# Patient Record
Sex: MALE | Race: WHITE | NOT HISPANIC OR LATINO | Employment: UNEMPLOYED | URBAN - METROPOLITAN AREA
[De-identification: names, ages, dates, MRNs, and addresses within clinical notes are randomized per-mention and may not be internally consistent; named-entity substitution may affect disease eponyms.]

---

## 2020-07-31 ENCOUNTER — APPOINTMENT (OUTPATIENT)
Dept: RADIOLOGY | Facility: MEDICAL CENTER | Age: 30
End: 2020-07-31
Attending: EMERGENCY MEDICINE

## 2020-07-31 ENCOUNTER — HOSPITAL ENCOUNTER (EMERGENCY)
Facility: MEDICAL CENTER | Age: 30
End: 2020-08-01
Attending: EMERGENCY MEDICINE

## 2020-07-31 DIAGNOSIS — R07.9 CHEST PAIN, UNSPECIFIED TYPE: ICD-10-CM

## 2020-07-31 DIAGNOSIS — J45.21 MILD INTERMITTENT ASTHMA WITH ACUTE EXACERBATION: ICD-10-CM

## 2020-07-31 PROCEDURE — 93005 ELECTROCARDIOGRAM TRACING: CPT | Performed by: EMERGENCY MEDICINE

## 2020-07-31 PROCEDURE — 99284 EMERGENCY DEPT VISIT MOD MDM: CPT

## 2020-07-31 PROCEDURE — 36415 COLL VENOUS BLD VENIPUNCTURE: CPT

## 2020-07-31 PROCEDURE — 700111 HCHG RX REV CODE 636 W/ 250 OVERRIDE (IP): Performed by: EMERGENCY MEDICINE

## 2020-07-31 PROCEDURE — 71045 X-RAY EXAM CHEST 1 VIEW: CPT

## 2020-07-31 PROCEDURE — 700102 HCHG RX REV CODE 250 W/ 637 OVERRIDE(OP): Performed by: EMERGENCY MEDICINE

## 2020-07-31 PROCEDURE — A9270 NON-COVERED ITEM OR SERVICE: HCPCS | Performed by: EMERGENCY MEDICINE

## 2020-07-31 RX ORDER — ALBUTEROL SULFATE 90 UG/1
2 AEROSOL, METERED RESPIRATORY (INHALATION) ONCE
Status: COMPLETED | OUTPATIENT
Start: 2020-07-31 | End: 2020-07-31

## 2020-07-31 RX ORDER — PREDNISONE 20 MG/1
40 TABLET ORAL ONCE
Status: COMPLETED | OUTPATIENT
Start: 2020-07-31 | End: 2020-07-31

## 2020-07-31 RX ADMIN — PREDNISONE 40 MG: 20 TABLET ORAL at 23:26

## 2020-07-31 RX ADMIN — ALBUTEROL SULFATE 2 PUFF: 90 AEROSOL, METERED RESPIRATORY (INHALATION) at 23:26

## 2020-07-31 ASSESSMENT — LIFESTYLE VARIABLES
DOES PATIENT WANT TO STOP DRINKING: NO
DO YOU DRINK ALCOHOL: NO

## 2020-08-01 VITALS
WEIGHT: 115.74 LBS | SYSTOLIC BLOOD PRESSURE: 99 MMHG | DIASTOLIC BLOOD PRESSURE: 61 MMHG | BODY MASS INDEX: 18.6 KG/M2 | HEART RATE: 71 BPM | RESPIRATION RATE: 20 BRPM | TEMPERATURE: 97.7 F | HEIGHT: 66 IN | OXYGEN SATURATION: 93 %

## 2020-08-01 LAB — EKG IMPRESSION: NORMAL

## 2020-08-01 RX ORDER — FLUTICASONE PROPIONATE 220 UG/1
1 AEROSOL, METERED RESPIRATORY (INHALATION) 2 TIMES DAILY
Qty: 12 G | Refills: 0 | Status: SHIPPED | OUTPATIENT
Start: 2020-08-01 | End: 2024-03-05

## 2020-08-01 RX ORDER — PREDNISONE 20 MG/1
40 TABLET ORAL DAILY
Qty: 6 TAB | Refills: 0 | Status: SHIPPED | OUTPATIENT
Start: 2020-08-01 | End: 2020-08-04

## 2020-08-01 RX ORDER — ALBUTEROL SULFATE 90 UG/1
2 AEROSOL, METERED RESPIRATORY (INHALATION) EVERY 6 HOURS PRN
Qty: 8.5 G | Refills: 0 | Status: ON HOLD | OUTPATIENT
Start: 2020-08-01 | End: 2024-03-07

## 2020-08-01 NOTE — ED TRIAGE NOTES
"Sree Jack   30 y.o. male   Chief Complaint   Patient presents with   • Asthma     pt has hx asthma, felt SOB today and does not have an inhaler. he is newly homeless and relocated from [place]      Pt amb to triage with steady gait for above complaint.   Pt is alert and oriented, speaking in full sentences, follows commands and responds appropriately to questions. Pt has audible wheezing with some accessory muscle use, pt not tachypneic and does not appear to be in severe distress. Charge Nurse kodi made aware of patient.    Pt placed in lobby. Pt educated on triage process. Pt encouraged to alert staff for any changes.    /63   Pulse 69   Temp 36.4 °C (97.5 °F) (Temporal)   Resp 20   Ht 1.676 m (5' 6\")   Wt 52.5 kg (115 lb 11.9 oz)   SpO2 93%   BMI 18.68 kg/m²     "

## 2020-08-01 NOTE — DISCHARGE INSTRUCTIONS
You are seen in the emergency department for an asthma exacerbation.  This improved after inhaler treatment.  You are being prescribed an inhaler, as well as several days of steroids to help prevent recurrence of this.  You are also being started on a controlling inhaler that you should use daily to prevent asthma exacerbations.  Your chest pain work-up was reassuring.    It is important you establish care with a doctor to act as your primary care physician.    Please return to the emergency department or seek medical attention if you develop:  Difficulty breathing, vomiting, fevers, any other new or concerning findings    ================================  Coronavirus Information    Your visit did NOT appear to relate to coronavirus, but if you or your family develop symptoms that concern you for coronavirus (please see CDC website for symptoms), please contact the [facility] hotline (or your local health department)  or your healthcare provider before going to a medical facility:    [facility]  24hr hotline: [phone]    Information is available from the Centers for Disease Control and Prevention  www.CDC.gov    and     [facility]  [email or web address]    If you are severely ill or having a hard time breathing, please immediatly seek medical care. Notify the  or Emergency Department Triage about your symptoms.

## 2020-08-01 NOTE — ED NOTES
Pt discharged. VSS on RA. Pt understands discharge instructions and medications. Paper prescriptions given. All belongings accounted for. Pt ambulatory with steady gait to ER ruddy.

## 2020-08-01 NOTE — ED PROVIDER NOTES
ED Provider Note    Scribed for Kenneth Burger M.D. by Yobany Gamboa. 7/31/2020,  11:08 PM.    Means of Arrival: Walk-in  History obtained from: Patient  History limited by: None    CHIEF COMPLAINT  Chief Complaint   Patient presents with   • Asthma     pt has hx asthma, felt SOB today and does not have an inhaler. he is newly homeless and relocated from [facility]  Sree Jack is a 30 y.o. male with a history of asthma who presents to the Emergency Department complaining of shortness of breath that began several hours prior to my exam. He usually has an asthma flare up early in the morning, occasionally during the day, and every evening. He was not able to manage his symptoms on his own this evening, prompting him to present to the ED. He began to experience some chest pain about 30 minutes prior to my exam. Patient complains of associated productive cough. He states his sputum is occasionally frothy. He denies fever or leg swelling. He is currently out of his albuterol. He has never been on a maintenance inhaler. The patient recently relocated here from [place] and is currently gathering paperwork for residence. He is currently living in his vehicle. He is otherwise healthy.    PPE Note: I personally donned full PPE for all patient encounters during this visit, including being clean-shaven with a surgical mask, gloves, and goggles.     Scribe remained outside the patient's room and did not have any contact with the patient for the duration of patient encounter.    REVIEW OF SYSTEMS  CONSTITUTIONAL:  No fever.  CARDIOVASCULAR:  Positive for chest discomfort.  RESPIRATORY:  Positive for shortness of breath and productive cough.  MUSCULOSKELETAL:  No leg swelling.    See [facility] for further details.   All other systems are negative.     PAST MEDICAL HISTORY  Past Medical History:   Diagnosis Date   • Asthma        FAMILY HISTORY  History reviewed. No pertinent family history.    SOCIAL HISTORY    "reports that he has been smoking cigarettes. He has never used smokeless tobacco. He reports previous alcohol use. He reports that he does not use drugs.    SURGICAL HISTORY  History reviewed. No pertinent surgical history.    CURRENT MEDICATIONS  Home Medications     Reviewed by Ellen Junior R.N. (Registered Nurse) on 20 at 2120  Med List Status: Complete   Medication Last Dose Status        Patient Saurabh Taking any Medications                       ALLERGIES  No Known Allergies    PHYSICAL EXAM  VITAL SIGNS: /63   Pulse 69   Temp 36.4 °C (97.5 °F) (Temporal)   Resp 20   Ht 1.676 m (5' 6\")   Wt 52.5 kg (115 lb 11.9 oz)   SpO2 93%   BMI 18.68 kg/m²    Gen: Alert, no acute distress  HEENT: ATNC  Eyes: PERRL, EOMI, normal conjunctiva.   Neck: trachea midline  Resp: no respiratory distress clear to auscultation bilaterally, slightly prolonged expiratory phase  CV: No JVD regular rate and rhythm, no murmurs, rubs, gallops  Abd: non-distended, nontender  Ext: No deformities, no edema, no calf tenderness  Psych: normal mood  Neuro: speech fluent     DIAGNOSTIC STUDIES / PROCEDURES     EKG  Results for orders placed or performed during the hospital encounter of 20   EKG (NOW)   Result Value Ref Range    Report       [facility] Emergency Dept.    Test Date:  2020  Pt Name:    YAMILEX CARREON               Department: ER  MRN:        [id number]                      Room:       [facility]  Gender:     Male                         Technician: [id number]  :        1990                   Requested By:KENNETH BURGER  Order #:    [id number]                    Reading MD: Kenneth Burger    Measurements  Intervals                                Axis  Rate:       75                           P:          83  ME:         148                          QRS:        92  QRSD:       102                          T:          58  QT:         392  QTc:        438    Interpretive Statements  SINUS " RHYTHM  BORDERLINE RIGHT AXIS DEVIATION  No previous ECG available for comparison  Electronically Signed On 8-1-2020 0:21:31 PDT by Kenneth Burger            RADIOLOGY  DX-CHEST-PORTABLE (1 VIEW)   Final Result         1.  No acute cardiopulmonary disease.        The radiologist’s interpretation of all radiology studies have been reviewed by me.    COURSE & MEDICAL DECISION MAKING  Pertinent Labs & Imaging studies reviewed. (See chart for details)    11:08 PM Patient seen and examined at bedside. Ordered for chest x-ray and EKG to evaluate. Patient will be treated with albuterol 2 puff and prednisone 40 mg for his symptoms.    12:21 AM - Re-examined; The patient is resting in bed and feeling improved after breathing treatments. I discussed plans for discharge with a prescription for albuterol. He was instructed to return to the ED if his symptoms worsen. Patient understands and agrees.    Medical Decision Making:  Patient presents with report of asthma exacerbation.  He felt improved after albuterol treatment.  Low suspicion for DVT/PE.  Chest x-ray demonstrates no pneumonia, pneumothorax.  Given the duration of symptoms and frequency of need for rescue inhaler, patient will be started on controller medication.  EKG nonischemic. Heart score: 1    The patient will return for new or worsening symptoms and is stable at the time of discharge.    The patient is referred to a primary physician for blood pressure management, diabetic screening, and for all other preventative health concerns.    DISPOSITION:  Patient will be discharged home in stable condition.    FOLLOW UP:  To establish a primary care provider within our system, please call [phone]          [facility], Emergency Dept  [address] [address] [phone]    If symptoms worsen      OUTPATIENT MEDICATIONS:  Discharge Medication List as of 8/1/2020 12:27 AM      START taking these medications    Details   albuterol 108  (90 Base) MCG/ACT Aero Soln inhalation aerosol Inhale 2 Puffs by mouth every 6 hours as needed for Shortness of Breath., Disp-8.5 g,R-0, Print Rx Paper      predniSONE (DELTASONE) 20 MG Tab Take 2 Tabs by mouth every day for 3 days., Disp-6 Tab,R-0, Print Rx Paper      fluticasone (FLOVENT HFA) 220 MCG/ACT Aerosol Inhale 1 Puff by mouth 2 times a day., Disp-12 g,R-0, Print Rx Paper             FINAL IMPRESSION  1. Mild intermittent asthma with acute exacerbation    2. Chest pain, unspecified type            I, Yobany Gamboa (Scribe), am scribing for, and in the presence of, Kenneth Burger M.D..    Electronically signed by: Yobany Gamboa (Scribe), 7/31/2020    I, Kenneth Burger M.D. personally performed the services described in this documentation, as scribed by Yobany Gamboa in my presence, and it is both accurate and complete.    C    The note accurately reflects work and decisions made by me.  Kenneth Burger M.D.  8/1/2020  4:40 AM

## 2023-10-04 ENCOUNTER — HOSPITAL ENCOUNTER (EMERGENCY)
Facility: MEDICAL CENTER | Age: 33
End: 2023-10-04
Attending: STUDENT IN AN ORGANIZED HEALTH CARE EDUCATION/TRAINING PROGRAM

## 2023-10-04 ENCOUNTER — APPOINTMENT (OUTPATIENT)
Dept: RADIOLOGY | Facility: MEDICAL CENTER | Age: 33
End: 2023-10-04
Attending: STUDENT IN AN ORGANIZED HEALTH CARE EDUCATION/TRAINING PROGRAM

## 2023-10-04 VITALS
OXYGEN SATURATION: 91 % | HEART RATE: 108 BPM | SYSTOLIC BLOOD PRESSURE: 117 MMHG | TEMPERATURE: 97 F | RESPIRATION RATE: 13 BRPM | WEIGHT: 118.17 LBS | DIASTOLIC BLOOD PRESSURE: 66 MMHG

## 2023-10-04 DIAGNOSIS — J45.901 MILD ASTHMA WITH ACUTE EXACERBATION, UNSPECIFIED WHETHER PERSISTENT: ICD-10-CM

## 2023-10-04 LAB
ALBUMIN SERPL BCP-MCNC: 4.3 G/DL (ref 3.2–4.9)
ALBUMIN/GLOB SERPL: 1.5 G/DL
ALP SERPL-CCNC: 55 U/L (ref 30–99)
ALT SERPL-CCNC: 16 U/L (ref 2–50)
ANION GAP SERPL CALC-SCNC: 13 MMOL/L (ref 7–16)
AST SERPL-CCNC: 24 U/L (ref 12–45)
BASOPHILS # BLD AUTO: 0.5 % (ref 0–1.8)
BASOPHILS # BLD: 0.06 K/UL (ref 0–0.12)
BILIRUB SERPL-MCNC: 0.2 MG/DL (ref 0.1–1.5)
BUN SERPL-MCNC: 11 MG/DL (ref 8–22)
CALCIUM ALBUM COR SERPL-MCNC: 8.8 MG/DL (ref 8.5–10.5)
CALCIUM SERPL-MCNC: 9 MG/DL (ref 8.5–10.5)
CHLORIDE SERPL-SCNC: 101 MMOL/L (ref 96–112)
CO2 SERPL-SCNC: 25 MMOL/L (ref 20–33)
CREAT SERPL-MCNC: 0.7 MG/DL (ref 0.5–1.4)
EKG IMPRESSION: NORMAL
EOSINOPHIL # BLD AUTO: 0.19 K/UL (ref 0–0.51)
EOSINOPHIL NFR BLD: 1.5 % (ref 0–6.9)
ERYTHROCYTE [DISTWIDTH] IN BLOOD BY AUTOMATED COUNT: 42.2 FL (ref 35.9–50)
GFR SERPLBLD CREATININE-BSD FMLA CKD-EPI: 125 ML/MIN/1.73 M 2
GLOBULIN SER CALC-MCNC: 2.8 G/DL (ref 1.9–3.5)
GLUCOSE SERPL-MCNC: 85 MG/DL (ref 65–99)
HCT VFR BLD AUTO: 42.6 % (ref 42–52)
HGB BLD-MCNC: 14.8 G/DL (ref 14–18)
IMM GRANULOCYTES # BLD AUTO: 0.04 K/UL (ref 0–0.11)
IMM GRANULOCYTES NFR BLD AUTO: 0.3 % (ref 0–0.9)
LYMPHOCYTES # BLD AUTO: 1.42 K/UL (ref 1–4.8)
LYMPHOCYTES NFR BLD: 10.9 % (ref 22–41)
MCH RBC QN AUTO: 33 PG (ref 27–33)
MCHC RBC AUTO-ENTMCNC: 34.7 G/DL (ref 32.3–36.5)
MCV RBC AUTO: 95.1 FL (ref 81.4–97.8)
MONOCYTES # BLD AUTO: 1.28 K/UL (ref 0–0.85)
MONOCYTES NFR BLD AUTO: 9.8 % (ref 0–13.4)
NEUTROPHILS # BLD AUTO: 10.01 K/UL (ref 1.82–7.42)
NEUTROPHILS NFR BLD: 77 % (ref 44–72)
NRBC # BLD AUTO: 0 K/UL
NRBC BLD-RTO: 0 /100 WBC (ref 0–0.2)
PLATELET # BLD AUTO: 242 K/UL (ref 164–446)
PMV BLD AUTO: 10.1 FL (ref 9–12.9)
POTASSIUM SERPL-SCNC: 3.7 MMOL/L (ref 3.6–5.5)
PROT SERPL-MCNC: 7.1 G/DL (ref 6–8.2)
RBC # BLD AUTO: 4.48 M/UL (ref 4.7–6.1)
SODIUM SERPL-SCNC: 139 MMOL/L (ref 135–145)
TROPONIN T SERPL-MCNC: <6 NG/L (ref 6–19)
WBC # BLD AUTO: 13 K/UL (ref 4.8–10.8)

## 2023-10-04 PROCEDURE — 700102 HCHG RX REV CODE 250 W/ 637 OVERRIDE(OP): Performed by: STUDENT IN AN ORGANIZED HEALTH CARE EDUCATION/TRAINING PROGRAM

## 2023-10-04 PROCEDURE — 80053 COMPREHEN METABOLIC PANEL: CPT

## 2023-10-04 PROCEDURE — 36415 COLL VENOUS BLD VENIPUNCTURE: CPT

## 2023-10-04 PROCEDURE — A9270 NON-COVERED ITEM OR SERVICE: HCPCS | Performed by: STUDENT IN AN ORGANIZED HEALTH CARE EDUCATION/TRAINING PROGRAM

## 2023-10-04 PROCEDURE — 700111 HCHG RX REV CODE 636 W/ 250 OVERRIDE (IP): Mod: JZ | Performed by: STUDENT IN AN ORGANIZED HEALTH CARE EDUCATION/TRAINING PROGRAM

## 2023-10-04 PROCEDURE — 93005 ELECTROCARDIOGRAM TRACING: CPT | Performed by: STUDENT IN AN ORGANIZED HEALTH CARE EDUCATION/TRAINING PROGRAM

## 2023-10-04 PROCEDURE — 84484 ASSAY OF TROPONIN QUANT: CPT

## 2023-10-04 PROCEDURE — 96374 THER/PROPH/DIAG INJ IV PUSH: CPT

## 2023-10-04 PROCEDURE — 85025 COMPLETE CBC W/AUTO DIFF WBC: CPT

## 2023-10-04 PROCEDURE — 99284 EMERGENCY DEPT VISIT MOD MDM: CPT

## 2023-10-04 PROCEDURE — 700101 HCHG RX REV CODE 250: Performed by: STUDENT IN AN ORGANIZED HEALTH CARE EDUCATION/TRAINING PROGRAM

## 2023-10-04 PROCEDURE — 94640 AIRWAY INHALATION TREATMENT: CPT

## 2023-10-04 PROCEDURE — 71045 X-RAY EXAM CHEST 1 VIEW: CPT

## 2023-10-04 RX ORDER — ACETAMINOPHEN 500 MG
1000 TABLET ORAL ONCE
Status: COMPLETED | OUTPATIENT
Start: 2023-10-04 | End: 2023-10-04

## 2023-10-04 RX ORDER — ALBUTEROL SULFATE 90 UG/1
6 AEROSOL, METERED RESPIRATORY (INHALATION) ONCE
Status: COMPLETED | OUTPATIENT
Start: 2023-10-04 | End: 2023-10-04

## 2023-10-04 RX ORDER — IBUPROFEN 600 MG/1
600 TABLET ORAL ONCE
Status: COMPLETED | OUTPATIENT
Start: 2023-10-04 | End: 2023-10-04

## 2023-10-04 RX ORDER — METHYLPREDNISOLONE SODIUM SUCCINATE 125 MG/2ML
125 INJECTION, POWDER, LYOPHILIZED, FOR SOLUTION INTRAMUSCULAR; INTRAVENOUS ONCE
Status: COMPLETED | OUTPATIENT
Start: 2023-10-04 | End: 2023-10-04

## 2023-10-04 RX ORDER — ACETAMINOPHEN 500 MG
500-1000 TABLET ORAL EVERY 6 HOURS PRN
Qty: 30 TABLET | Refills: 0 | Status: ON HOLD | OUTPATIENT
Start: 2023-10-04 | End: 2024-03-07

## 2023-10-04 RX ORDER — PREDNISONE 20 MG/1
60 TABLET ORAL DAILY
Qty: 12 TABLET | Refills: 0 | Status: SHIPPED | OUTPATIENT
Start: 2023-10-05 | End: 2023-10-09

## 2023-10-04 RX ADMIN — IBUPROFEN 600 MG: 600 TABLET ORAL at 21:27

## 2023-10-04 RX ADMIN — METHYLPREDNISOLONE SODIUM SUCCINATE 125 MG: 125 INJECTION, POWDER, FOR SOLUTION INTRAMUSCULAR; INTRAVENOUS at 19:56

## 2023-10-04 RX ADMIN — ACETAMINOPHEN 1000 MG: 500 TABLET ORAL at 21:25

## 2023-10-04 RX ADMIN — ALBUTEROL SULFATE 2.5 MG: 2.5 SOLUTION RESPIRATORY (INHALATION) at 20:19

## 2023-10-04 RX ADMIN — ALBUTEROL SULFATE 6 PUFF: 90 AEROSOL, METERED RESPIRATORY (INHALATION) at 21:24

## 2023-10-04 ASSESSMENT — COPD QUESTIONNAIRES
HAVE YOU SMOKED AT LEAST 100 CIGARETTES IN YOUR ENTIRE LIFE: YES
COPD SCREENING SCORE: 3
DURING THE PAST 4 WEEKS HOW MUCH DID YOU FEEL SHORT OF BREATH: NONE/LITTLE OF THE TIME
DO YOU EVER COUGH UP ANY MUCUS OR PHLEGM?: YES, A FEW DAYS A WEEK OR MONTH

## 2023-10-05 NOTE — ED TRIAGE NOTES
Chief Complaint   Patient presents with    Asthma     Pt states asthma attack since 1500 today.  Pt is out of his rescue inhaler.

## 2023-10-05 NOTE — ED NOTES
Discharge instructions discussed with pt, verbalizes understanding. Medication given per MAR. PIV removed. Work note given to pt.  All belongings with pt when leaving unit. Pt amb to lobby steady gait.

## 2023-10-05 NOTE — ED PROVIDER NOTES
"  ER Provider Note    Scribed for Vijay Ayala M.d. by Charli Menchaca. 10/4/2023  7:33 PM    Primary Care Provider: None noted    CHIEF COMPLAINT  Chief Complaint   Patient presents with    Asthma     Pt states asthma attack since 1500 today.  Pt is out of his rescue inhaler.     EXTERNAL RECORDS REVIEWED  None available    HPI/ROS    LIMITATION TO HISTORY   Select: : None    Sree Jack is a 43 y.o. male who presents to the ED for asthma complications onset earlier today. He has a history of asthma, and began to have an attack around 3 PM today. He states that it feels like \"a fat kid is sitting on my chest choking me\". He does not have access to his rescue inhaler. He has not received steroids in over a year. He endorses cough, fever and inability to take a deep breath. He also did note some chest pain that started  about 3 hours ago. He has been admitted for asthma complications in the past, states that these attacks happen frequently. He is unsure if he has been to the ICU for asthma. He has a history of smoking, last cigarettes was one week ago.     PAST MEDICAL HISTORY  History reviewed. No pertinent past medical history.    SURGICAL HISTORY  No past surgical history on file.    FAMILY HISTORY  No family history on file.    SOCIAL HISTORY       CURRENT MEDICATIONS  Previous Medications    No medications on file       ALLERGIES  Patient has no known allergies.    PHYSICAL EXAM  /75   Pulse (!) 115   Temp 36.2 °C (97.1 °F) (Temporal)   Resp 18   Wt 53.6 kg (118 lb 2.7 oz)   SpO2 97%   General: Disheveled appearing  Head: Normocephalic atraumatic  Eyes: Extraocular motion intact  Neck: Supple, no rigidity  Cardiovascular: Regular rate and rhythm no murmurs rubs or gallops  Respiratory:  equal chest rise and fall, no increased work of breathing. Wheezing in all lung fields, speaking full sentences  Abdomen: Soft nontender no guarding  Musculoskeletal: Warm and well perfused, no peripheral " edema  Neuro: Alert, no focal deficits  Integumentary: No wounds or rashes     DIAGNOSTIC STUDIES    Labs:   All labs reviewed by me.  Leukocytosis in the setting of acute asthma exacerbation, negative troponin, labs otherwise reassuring.    EKG:   I have independently interpreted this EKG   Results for orders placed or performed during the hospital encounter of 10/04/23   EKG (NOW)   Result Value Ref Range    Report       [facility] Emergency Dept.    Test Date:  2023-10-04  Pt Name:    YAMILEX CARREON               Department: ER  MRN:        [id number]                      Room:  Gender:     Male                         Technician: [id number]  :        1980                   Requested By:ER TRIAGE PROTOCOL  Order #:    [id number]                    Reading MD: Vijay Ayala    Measurements  Intervals                                Axis  Rate:       89                           P:          81  DC:         129                          QRS:        110  QRSD:       96                           T:          79  QT:         346  QTc:        421    Interpretive Statements  EKG: Normal sinus rhythm, rate of 89, normal axis, no ST elevations or  depressions.  Normal intervals.  Electronically Signed On 10- 19:59:15 PDT by Vijay Ayala         Radiology:   The attending emergency physician has independently interpreted the diagnostic imaging associated with this visit and am waiting the final reading from the radiologist.   Preliminary interpretation is a follows: No pneumonia.  Radiologist interpretation:   DX-CHEST-PORTABLE (1 VIEW)   Final Result         1. No acute cardiopulmonary abnormalities are identified.         COURSE & MEDICAL DECISION MAKING     ED Observation Status? Yes; I am placing the patient in to an observation status due to a diagnostic uncertainty as well as therapeutic intensity. Patient placed in observation status at 7:40 PM, 10/4/2023.     Observation plan is as follows:  Monitor for symptom management and diagnostic results     Upon Reevaluation, the patient's condition has: Improved; and will be discharged.    Patient discharged from ED Observation status at 9:00 PM (Time) 10/4/2023 (Date).     INITIAL ASSESSMENT, COURSE AND PLAN  Care Narrative: 43-year-old history of asthma presenting with 1 day of symptoms, out of an inhaler for 2 days, unsure when his last steroid treatment was, generally uses the over-the-counter [employer] inhaler suspect epinephrine inhaler, for symptoms, but ran out.  Has been smoking intermittently but stopped 1 week ago.  Vital signs notable for borderline tachycardia, otherwise reassuring, on exam he is wheezing in all lung fields, but no increased work of breathing, no use of accessory muscles, speaking in full and unlabored sentences.  Differential diagnose includes not limited to: Asthma exacerbation, atypical ACS, arrhythmia, pneumonia, viral syndrome.    7:40 PM - Patient seen and examined at bedside. He presents for acute asthma attack that began earlier this afternoon. Endorses cough, fever and chest pain. Has not been able to use his inhaler. Exam shows wheezing in all fields, no increased work of breathing though. Plan for chest pain labs, imaging and EKG. Will also provide albuterol 2.5 mg and Solumedrol 125 mg for symptom management.    9:00 PM - Patient was reevaluated at bedside. Discussed lab and radiology results with the patient and informed them that they are reassuring. No evidence of pneumonia on chest x-ray. Patient will now be discharged at this time. Discussed return precautions and plan for at home care. Will give inhaler before discharge. Patient verbalizes understanding and agreement to this plan of care.     Mild tachycardia at the time of of discharge patient had normal heart rate prior to nebulizer treatment.        DISPOSITION AND DISCUSSIONS    Discussion of management with other \[facility]\"" or appropriate source(s): None      Escalation of care considered, and ultimately not performed: IV fluids.,  Antibiotics    Barriers to care at this time, including but not limited to: None    DISPOSITION:  Patient will be discharged home in stable condition.    FINAL DIANGOSIS  1. Mild asthma with acute exacerbation, unspecified whether persistent      Charli FLEMING (Scriblashae), am scribing for, and in the presence of, Vijay Ayala M.D..    Electronically signed by: Charli Menchaca (Ezio), 10/4/2023    Vijay FLEMING M.D. personally performed the services described in this documentation, as scribed by Charli Menchaca in my presence, and it is both accurate and complete.      The note accurately reflects work and decisions made by me.  Vijay Ayala M.D.  10/5/2023  1:41 AM

## 2023-10-05 NOTE — DISCHARGE INSTRUCTIONS
Use the albuterol inhaler 2 puffs every 4 hours for the next 2 days.  If you still feel short of breath, you can use another 2 puffs in that 4-hour period but if you use more than 4 puffs in 4 hours, you need to return immediately for further evaluation.  You can take the steroids as prescribed, continue the prescription for 4 days starting tomorrow.  You can take Tylenol and ibuprofen as needed for your discomfort.

## 2024-03-05 ENCOUNTER — APPOINTMENT (OUTPATIENT)
Dept: RADIOLOGY | Facility: MEDICAL CENTER | Age: 34
DRG: 202 | End: 2024-03-05
Attending: EMERGENCY MEDICINE

## 2024-03-05 ENCOUNTER — HOSPITAL ENCOUNTER (INPATIENT)
Facility: MEDICAL CENTER | Age: 34
LOS: 2 days | DRG: 202 | End: 2024-03-07
Attending: EMERGENCY MEDICINE | Admitting: STUDENT IN AN ORGANIZED HEALTH CARE EDUCATION/TRAINING PROGRAM

## 2024-03-05 DIAGNOSIS — J96.01 ACUTE RESPIRATORY FAILURE WITH HYPOXIA (HCC): ICD-10-CM

## 2024-03-05 DIAGNOSIS — J45.51 SEVERE PERSISTENT ASTHMA WITH ACUTE EXACERBATION: ICD-10-CM

## 2024-03-05 PROBLEM — F17.290 VAPING NICOTINE DEPENDENCE, TOBACCO PRODUCT: Status: ACTIVE | Noted: 2024-03-05

## 2024-03-05 PROBLEM — D72.829 LEUKOCYTOSIS: Status: ACTIVE | Noted: 2024-03-05

## 2024-03-05 PROBLEM — Z59.00 HOMELESSNESS: Status: ACTIVE | Noted: 2024-03-05

## 2024-03-05 PROBLEM — J45.41 MODERATE PERSISTENT ASTHMA WITH ACUTE EXACERBATION: Status: ACTIVE | Noted: 2024-03-05

## 2024-03-05 PROBLEM — J96.21 ACUTE ON CHRONIC RESPIRATORY FAILURE WITH HYPOXIA (HCC): Status: ACTIVE | Noted: 2024-03-05

## 2024-03-05 LAB
EKG IMPRESSION: NORMAL
FLUAV RNA SPEC QL NAA+PROBE: NEGATIVE
FLUBV RNA SPEC QL NAA+PROBE: NEGATIVE
MAGNESIUM SERPL-MCNC: 2.1 MG/DL (ref 1.5–2.5)
RSV RNA SPEC QL NAA+PROBE: NEGATIVE
SARS-COV-2 RNA RESP QL NAA+PROBE: NOTDETECTED

## 2024-03-05 PROCEDURE — 770020 HCHG ROOM/CARE - TELE (206)

## 2024-03-05 PROCEDURE — 96372 THER/PROPH/DIAG INJ SC/IM: CPT

## 2024-03-05 PROCEDURE — 36415 COLL VENOUS BLD VENIPUNCTURE: CPT

## 2024-03-05 PROCEDURE — 82785 ASSAY OF IGE: CPT

## 2024-03-05 PROCEDURE — 0241U HCHG SARS-COV-2 COVID-19 NFCT DS RESP RNA 4 TRGT ED POC: CPT

## 2024-03-05 PROCEDURE — 96375 TX/PRO/DX INJ NEW DRUG ADDON: CPT

## 2024-03-05 PROCEDURE — 700101 HCHG RX REV CODE 250: Performed by: STUDENT IN AN ORGANIZED HEALTH CARE EDUCATION/TRAINING PROGRAM

## 2024-03-05 PROCEDURE — 96366 THER/PROPH/DIAG IV INF ADDON: CPT

## 2024-03-05 PROCEDURE — 94640 AIRWAY INHALATION TREATMENT: CPT

## 2024-03-05 PROCEDURE — 700102 HCHG RX REV CODE 250 W/ 637 OVERRIDE(OP): Performed by: STUDENT IN AN ORGANIZED HEALTH CARE EDUCATION/TRAINING PROGRAM

## 2024-03-05 PROCEDURE — 99223 1ST HOSP IP/OBS HIGH 75: CPT | Mod: GC | Performed by: STUDENT IN AN ORGANIZED HEALTH CARE EDUCATION/TRAINING PROGRAM

## 2024-03-05 PROCEDURE — 700101 HCHG RX REV CODE 250: Performed by: EMERGENCY MEDICINE

## 2024-03-05 PROCEDURE — 700111 HCHG RX REV CODE 636 W/ 250 OVERRIDE (IP): Mod: JZ | Performed by: EMERGENCY MEDICINE

## 2024-03-05 PROCEDURE — A9270 NON-COVERED ITEM OR SERVICE: HCPCS | Performed by: STUDENT IN AN ORGANIZED HEALTH CARE EDUCATION/TRAINING PROGRAM

## 2024-03-05 PROCEDURE — 700105 HCHG RX REV CODE 258: Performed by: EMERGENCY MEDICINE

## 2024-03-05 PROCEDURE — 96365 THER/PROPH/DIAG IV INF INIT: CPT

## 2024-03-05 PROCEDURE — 700111 HCHG RX REV CODE 636 W/ 250 OVERRIDE (IP): Mod: JZ | Performed by: STUDENT IN AN ORGANIZED HEALTH CARE EDUCATION/TRAINING PROGRAM

## 2024-03-05 PROCEDURE — 99285 EMERGENCY DEPT VISIT HI MDM: CPT

## 2024-03-05 PROCEDURE — 83735 ASSAY OF MAGNESIUM: CPT

## 2024-03-05 PROCEDURE — 93005 ELECTROCARDIOGRAM TRACING: CPT | Performed by: EMERGENCY MEDICINE

## 2024-03-05 PROCEDURE — 71045 X-RAY EXAM CHEST 1 VIEW: CPT

## 2024-03-05 RX ORDER — PREDNISONE 20 MG/1
40 TABLET ORAL DAILY
Status: DISCONTINUED | OUTPATIENT
Start: 2024-03-06 | End: 2024-03-06

## 2024-03-05 RX ORDER — AMOXICILLIN 250 MG
2 CAPSULE ORAL EVERY EVENING
Status: DISCONTINUED | OUTPATIENT
Start: 2024-03-05 | End: 2024-03-07 | Stop reason: HOSPADM

## 2024-03-05 RX ORDER — ENOXAPARIN SODIUM 100 MG/ML
40 INJECTION SUBCUTANEOUS DAILY
Status: DISCONTINUED | OUTPATIENT
Start: 2024-03-05 | End: 2024-03-07 | Stop reason: HOSPADM

## 2024-03-05 RX ORDER — MAGNESIUM SULFATE HEPTAHYDRATE 40 MG/ML
2 INJECTION, SOLUTION INTRAVENOUS ONCE
Status: COMPLETED | OUTPATIENT
Start: 2024-03-05 | End: 2024-03-05

## 2024-03-05 RX ORDER — ACETAMINOPHEN 325 MG/1
650 TABLET ORAL EVERY 6 HOURS PRN
Status: DISCONTINUED | OUTPATIENT
Start: 2024-03-05 | End: 2024-03-07 | Stop reason: HOSPADM

## 2024-03-05 RX ORDER — METHYLPREDNISOLONE SODIUM SUCCINATE 40 MG/ML
40 INJECTION, POWDER, LYOPHILIZED, FOR SOLUTION INTRAMUSCULAR; INTRAVENOUS ONCE
Status: COMPLETED | OUTPATIENT
Start: 2024-03-05 | End: 2024-03-05

## 2024-03-05 RX ORDER — POLYETHYLENE GLYCOL 3350 17 G/17G
1 POWDER, FOR SOLUTION ORAL
Status: DISCONTINUED | OUTPATIENT
Start: 2024-03-05 | End: 2024-03-07 | Stop reason: HOSPADM

## 2024-03-05 RX ORDER — PREDNISONE 20 MG/1
40 TABLET ORAL DAILY
Status: DISCONTINUED | OUTPATIENT
Start: 2024-03-06 | End: 2024-03-05

## 2024-03-05 RX ORDER — SODIUM CHLORIDE 9 MG/ML
1000 INJECTION, SOLUTION INTRAVENOUS ONCE
Status: COMPLETED | OUTPATIENT
Start: 2024-03-05 | End: 2024-03-05

## 2024-03-05 RX ORDER — IPRATROPIUM BROMIDE AND ALBUTEROL SULFATE 2.5; .5 MG/3ML; MG/3ML
3 SOLUTION RESPIRATORY (INHALATION) ONCE
Status: COMPLETED | OUTPATIENT
Start: 2024-03-05 | End: 2024-03-05

## 2024-03-05 RX ORDER — IPRATROPIUM BROMIDE AND ALBUTEROL SULFATE 2.5; .5 MG/3ML; MG/3ML
3 SOLUTION RESPIRATORY (INHALATION)
Status: DISCONTINUED | OUTPATIENT
Start: 2024-03-05 | End: 2024-03-06

## 2024-03-05 RX ADMIN — ENOXAPARIN SODIUM 40 MG: 100 INJECTION SUBCUTANEOUS at 21:27

## 2024-03-05 RX ADMIN — IPRATROPIUM BROMIDE AND ALBUTEROL SULFATE 3 ML: 2.5; .5 SOLUTION RESPIRATORY (INHALATION) at 22:27

## 2024-03-05 RX ADMIN — IPRATROPIUM BROMIDE AND ALBUTEROL SULFATE 3 ML: .5; 3 SOLUTION RESPIRATORY (INHALATION) at 19:02

## 2024-03-05 RX ADMIN — SODIUM CHLORIDE 1000 ML: 9 INJECTION, SOLUTION INTRAVENOUS at 19:36

## 2024-03-05 RX ADMIN — MAGNESIUM SULFATE HEPTAHYDRATE 2 G: 2 INJECTION, SOLUTION INTRAVENOUS at 19:37

## 2024-03-05 RX ADMIN — METHYLPREDNISOLONE SODIUM SUCCINATE 40 MG: 40 INJECTION, POWDER, FOR SOLUTION INTRAMUSCULAR; INTRAVENOUS at 22:48

## 2024-03-05 RX ADMIN — ALBUTEROL SULFATE 5 MG: 2.5 SOLUTION RESPIRATORY (INHALATION) at 16:54

## 2024-03-05 RX ADMIN — DOCUSATE SODIUM 50 MG AND SENNOSIDES 8.6 MG 2 TABLET: 8.6; 5 TABLET, FILM COATED ORAL at 21:27

## 2024-03-05 ASSESSMENT — LIFESTYLE VARIABLES
HAVE PEOPLE ANNOYED YOU BY CRITICIZING YOUR DRINKING: YES
TOTAL SCORE: 3
ON A TYPICAL DAY WHEN YOU DRINK ALCOHOL HOW MANY DRINKS DO YOU HAVE: 2
TOTAL SCORE: 3
EVER HAD A DRINK FIRST THING IN THE MORNING TO STEADY YOUR NERVES TO GET RID OF A HANGOVER: YES
CONSUMPTION TOTAL: POSITIVE
EVER FELT BAD OR GUILTY ABOUT YOUR DRINKING: NO
DOES PATIENT WANT TO STOP DRINKING: NO
AVERAGE NUMBER OF DAYS PER WEEK YOU HAVE A DRINK CONTAINING ALCOHOL: 0
ALCOHOL_USE: NO
HAVE YOU EVER FELT YOU SHOULD CUT DOWN ON YOUR DRINKING: YES
TOTAL SCORE: 3
HOW MANY TIMES IN THE PAST YEAR HAVE YOU HAD 5 OR MORE DRINKS IN A DAY: 1

## 2024-03-05 ASSESSMENT — COGNITIVE AND FUNCTIONAL STATUS - GENERAL
SUGGESTED CMS G CODE MODIFIER MOBILITY: CH
DAILY ACTIVITIY SCORE: 24
SUGGESTED CMS G CODE MODIFIER DAILY ACTIVITY: CH
MOBILITY SCORE: 24

## 2024-03-05 ASSESSMENT — PATIENT HEALTH QUESTIONNAIRE - PHQ9
2. FEELING DOWN, DEPRESSED, IRRITABLE, OR HOPELESS: NOT AT ALL
1. LITTLE INTEREST OR PLEASURE IN DOING THINGS: NOT AT ALL
2. FEELING DOWN, DEPRESSED, IRRITABLE, OR HOPELESS: NOT AT ALL
SUM OF ALL RESPONSES TO PHQ9 QUESTIONS 1 AND 2: 0
1. LITTLE INTEREST OR PLEASURE IN DOING THINGS: NOT AT ALL
SUM OF ALL RESPONSES TO PHQ9 QUESTIONS 1 AND 2: 0

## 2024-03-05 ASSESSMENT — FIBROSIS 4 INDEX
FIB4 SCORE: 0.79

## 2024-03-05 ASSESSMENT — COPD QUESTIONNAIRES
HAVE YOU SMOKED AT LEAST 100 CIGARETTES IN YOUR ENTIRE LIFE: YES
COPD SCREENING SCORE: 2
DO YOU EVER COUGH UP ANY MUCUS OR PHLEGM?: NO/ONLY WITH OCCASIONAL COLDS OR INFECTIONS
DURING THE PAST 4 WEEKS HOW MUCH DID YOU FEEL SHORT OF BREATH: NONE/LITTLE OF THE TIME

## 2024-03-05 ASSESSMENT — PAIN DESCRIPTION - PAIN TYPE
TYPE: ACUTE PAIN
TYPE: ACUTE PAIN

## 2024-03-06 LAB
ANION GAP SERPL CALC-SCNC: 12 MMOL/L (ref 7–16)
BASOPHILS # BLD AUTO: 0.1 % (ref 0–1.8)
BASOPHILS # BLD: 0.01 K/UL (ref 0–0.12)
BUN SERPL-MCNC: 10 MG/DL (ref 8–22)
CALCIUM SERPL-MCNC: 8.8 MG/DL (ref 8.5–10.5)
CHLORIDE SERPL-SCNC: 104 MMOL/L (ref 96–112)
CO2 SERPL-SCNC: 22 MMOL/L (ref 20–33)
CREAT SERPL-MCNC: 0.62 MG/DL (ref 0.5–1.4)
EOSINOPHIL # BLD AUTO: 0.04 K/UL (ref 0–0.51)
EOSINOPHIL NFR BLD: 0.4 % (ref 0–6.9)
ERYTHROCYTE [DISTWIDTH] IN BLOOD BY AUTOMATED COUNT: 41.8 FL (ref 35.9–50)
GFR SERPLBLD CREATININE-BSD FMLA CKD-EPI: 130 ML/MIN/1.73 M 2
GLUCOSE SERPL-MCNC: 162 MG/DL (ref 65–99)
HCT VFR BLD AUTO: 41.3 % (ref 42–52)
HGB BLD-MCNC: 14.1 G/DL (ref 14–18)
IMM GRANULOCYTES # BLD AUTO: 0.06 K/UL (ref 0–0.11)
IMM GRANULOCYTES NFR BLD AUTO: 0.6 % (ref 0–0.9)
LYMPHOCYTES # BLD AUTO: 0.95 K/UL (ref 1–4.8)
LYMPHOCYTES NFR BLD: 9.2 % (ref 22–41)
MAGNESIUM SERPL-MCNC: 2.2 MG/DL (ref 1.5–2.5)
MCH RBC QN AUTO: 31.5 PG (ref 27–33)
MCHC RBC AUTO-ENTMCNC: 34.1 G/DL (ref 32.3–36.5)
MCV RBC AUTO: 92.4 FL (ref 81.4–97.8)
MONOCYTES # BLD AUTO: 0.25 K/UL (ref 0–0.85)
MONOCYTES NFR BLD AUTO: 2.4 % (ref 0–13.4)
NEUTROPHILS # BLD AUTO: 9.07 K/UL (ref 1.82–7.42)
NEUTROPHILS NFR BLD: 87.3 % (ref 44–72)
NRBC # BLD AUTO: 0 K/UL
NRBC BLD-RTO: 0 /100 WBC (ref 0–0.2)
PLATELET # BLD AUTO: 305 K/UL (ref 164–446)
PMV BLD AUTO: 9.9 FL (ref 9–12.9)
POTASSIUM SERPL-SCNC: 3.6 MMOL/L (ref 3.6–5.5)
RBC # BLD AUTO: 4.47 M/UL (ref 4.7–6.1)
SODIUM SERPL-SCNC: 138 MMOL/L (ref 135–145)
WBC # BLD AUTO: 10.4 K/UL (ref 4.8–10.8)

## 2024-03-06 PROCEDURE — 700102 HCHG RX REV CODE 250 W/ 637 OVERRIDE(OP): Performed by: GENERAL PRACTICE

## 2024-03-06 PROCEDURE — 94640 AIRWAY INHALATION TREATMENT: CPT

## 2024-03-06 PROCEDURE — 85025 COMPLETE CBC W/AUTO DIFF WBC: CPT

## 2024-03-06 PROCEDURE — 700111 HCHG RX REV CODE 636 W/ 250 OVERRIDE (IP): Mod: JZ | Performed by: GENERAL PRACTICE

## 2024-03-06 PROCEDURE — 700101 HCHG RX REV CODE 250: Performed by: GENERAL PRACTICE

## 2024-03-06 PROCEDURE — 700111 HCHG RX REV CODE 636 W/ 250 OVERRIDE (IP): Performed by: STUDENT IN AN ORGANIZED HEALTH CARE EDUCATION/TRAINING PROGRAM

## 2024-03-06 PROCEDURE — 99233 SBSQ HOSP IP/OBS HIGH 50: CPT | Performed by: GENERAL PRACTICE

## 2024-03-06 PROCEDURE — A9270 NON-COVERED ITEM OR SERVICE: HCPCS | Performed by: STUDENT IN AN ORGANIZED HEALTH CARE EDUCATION/TRAINING PROGRAM

## 2024-03-06 PROCEDURE — 770020 HCHG ROOM/CARE - TELE (206)

## 2024-03-06 PROCEDURE — 700102 HCHG RX REV CODE 250 W/ 637 OVERRIDE(OP): Performed by: STUDENT IN AN ORGANIZED HEALTH CARE EDUCATION/TRAINING PROGRAM

## 2024-03-06 PROCEDURE — A9270 NON-COVERED ITEM OR SERVICE: HCPCS | Performed by: GENERAL PRACTICE

## 2024-03-06 PROCEDURE — 83735 ASSAY OF MAGNESIUM: CPT

## 2024-03-06 PROCEDURE — 700101 HCHG RX REV CODE 250: Performed by: STUDENT IN AN ORGANIZED HEALTH CARE EDUCATION/TRAINING PROGRAM

## 2024-03-06 PROCEDURE — 80048 BASIC METABOLIC PNL TOTAL CA: CPT

## 2024-03-06 RX ORDER — METHYLPREDNISOLONE SODIUM SUCCINATE 40 MG/ML
40 INJECTION, POWDER, LYOPHILIZED, FOR SOLUTION INTRAMUSCULAR; INTRAVENOUS EVERY 8 HOURS
Status: COMPLETED | OUTPATIENT
Start: 2024-03-06 | End: 2024-03-06

## 2024-03-06 RX ORDER — METHYLPREDNISOLONE SODIUM SUCCINATE 40 MG/ML
40 INJECTION, POWDER, LYOPHILIZED, FOR SOLUTION INTRAMUSCULAR; INTRAVENOUS EVERY 12 HOURS
Status: DISCONTINUED | OUTPATIENT
Start: 2024-03-07 | End: 2024-03-07

## 2024-03-06 RX ORDER — PREDNISONE 20 MG/1
40 TABLET ORAL DAILY
Status: DISCONTINUED | OUTPATIENT
Start: 2024-03-07 | End: 2024-03-06

## 2024-03-06 RX ORDER — LEVALBUTEROL INHALATION SOLUTION 1.25 MG/3ML
1.25 SOLUTION RESPIRATORY (INHALATION)
Status: DISCONTINUED | OUTPATIENT
Start: 2024-03-07 | End: 2024-03-07 | Stop reason: HOSPADM

## 2024-03-06 RX ORDER — LEVALBUTEROL INHALATION SOLUTION 1.25 MG/3ML
1.25 SOLUTION RESPIRATORY (INHALATION)
Status: DISCONTINUED | OUTPATIENT
Start: 2024-03-06 | End: 2024-03-06

## 2024-03-06 RX ORDER — MONTELUKAST SODIUM 10 MG/1
10 TABLET ORAL NIGHTLY
Status: DISCONTINUED | OUTPATIENT
Start: 2024-03-06 | End: 2024-03-07 | Stop reason: HOSPADM

## 2024-03-06 RX ORDER — LEVALBUTEROL INHALATION SOLUTION 1.25 MG/3ML
1.25 SOLUTION RESPIRATORY (INHALATION)
Status: DISCONTINUED | OUTPATIENT
Start: 2024-03-06 | End: 2024-03-07 | Stop reason: HOSPADM

## 2024-03-06 RX ADMIN — METHYLPREDNISOLONE SODIUM SUCCINATE 40 MG: 40 INJECTION, POWDER, FOR SOLUTION INTRAMUSCULAR; INTRAVENOUS at 22:00

## 2024-03-06 RX ADMIN — METHYLPREDNISOLONE SODIUM SUCCINATE 40 MG: 40 INJECTION, POWDER, FOR SOLUTION INTRAMUSCULAR; INTRAVENOUS at 15:23

## 2024-03-06 RX ADMIN — LEVALBUTEROL 1.25 MG: 1.25 SOLUTION RESPIRATORY (INHALATION) at 18:58

## 2024-03-06 RX ADMIN — MOMETASONE FUROATE AND FORMOTEROL FUMARATE DIHYDRATE 2 PUFF: 200; 5 AEROSOL RESPIRATORY (INHALATION) at 05:42

## 2024-03-06 RX ADMIN — LEVALBUTEROL 1.25 MG: 1.25 SOLUTION RESPIRATORY (INHALATION) at 23:13

## 2024-03-06 RX ADMIN — ENOXAPARIN SODIUM 40 MG: 100 INJECTION SUBCUTANEOUS at 17:26

## 2024-03-06 RX ADMIN — LEVALBUTEROL 1.25 MG: 1.25 SOLUTION RESPIRATORY (INHALATION) at 14:22

## 2024-03-06 RX ADMIN — MONTELUKAST 10 MG: 10 TABLET, FILM COATED ORAL at 21:24

## 2024-03-06 RX ADMIN — LEVALBUTEROL 1.25 MG: 1.25 SOLUTION RESPIRATORY (INHALATION) at 11:14

## 2024-03-06 RX ADMIN — IPRATROPIUM BROMIDE AND ALBUTEROL SULFATE 3 ML: 2.5; .5 SOLUTION RESPIRATORY (INHALATION) at 02:26

## 2024-03-06 RX ADMIN — MOMETASONE FUROATE AND FORMOTEROL FUMARATE DIHYDRATE 2 PUFF: 200; 5 AEROSOL RESPIRATORY (INHALATION) at 18:00

## 2024-03-06 RX ADMIN — IPRATROPIUM BROMIDE AND ALBUTEROL SULFATE 3 ML: 2.5; .5 SOLUTION RESPIRATORY (INHALATION) at 07:38

## 2024-03-06 RX ADMIN — PREDNISONE 40 MG: 20 TABLET ORAL at 05:42

## 2024-03-06 ASSESSMENT — ENCOUNTER SYMPTOMS
COUGH: 1
SHORTNESS OF BREATH: 1
WHEEZING: 1

## 2024-03-06 ASSESSMENT — PAIN DESCRIPTION - PAIN TYPE: TYPE: ACUTE PAIN

## 2024-03-06 ASSESSMENT — FIBROSIS 4 INDEX: FIB4 SCORE: 0.63

## 2024-03-06 ASSESSMENT — PATIENT HEALTH QUESTIONNAIRE - PHQ9
1. LITTLE INTEREST OR PLEASURE IN DOING THINGS: NOT AT ALL
SUM OF ALL RESPONSES TO PHQ9 QUESTIONS 1 AND 2: 0
2. FEELING DOWN, DEPRESSED, IRRITABLE, OR HOPELESS: NOT AT ALL

## 2024-03-06 NOTE — PROGRESS NOTES
Monitor Summary:     Rhythm: ST  Rate: 111-122  Ectopy: pac rare  Measurements: 0.14/0.08/0.32           12 Hour Chart Check

## 2024-03-06 NOTE — ED PROVIDER NOTES
"ED Provider Note    CHIEF COMPLAINT  Chief Complaint   Patient presents with    Shortness of Breath     Started around 1300 today. Run out of albuterol inhaler at home. Wheezing on all lung fields. Received Solumedrol 125 mg IV and Duoneb x2 and albuterol x1 from [facility]. Was 78% on RA. 098% on 4 LPM. Kids had influenza a week ago.        HPI/ROS  OUTSIDE HISTORIAN(S):  EMS      Sree Higinio Jack is a 33 y.o. male who presents for evaluation of shortness of breath that began several hours ago, has a history of asthma and ran out of his albuterol.  EMS reports that upon their arrival he had significant distress, he had a room air saturation of 78%.  Treated with Solu-Medrol and 2 breathing treatments, EMS reports his oxygen saturations improved.  Sick contact with influenza.  Otherwise he denies abdominal pain vomiting or fever.  No other complaints    PAST MEDICAL HISTORY   has a past medical history of Asthma.    SURGICAL HISTORY  patient denies any surgical history    FAMILY HISTORY  No family history on file.    SOCIAL HISTORY  Social History     Tobacco Use    Smoking status: Not on file    Smokeless tobacco: Never   Vaping Use    Vaping Use: Never used   Substance and Sexual Activity    Alcohol use: Not Currently    Drug use: Never    Sexual activity: Not on file       CURRENT MEDICATIONS  Home Medications       Reviewed by Chas Grove R.N. (Registered Nurse) on 03/05/24 at 1616  Med List Status: Partial     Medication Last Dose Status   acetaminophen (TYLENOL) 500 MG Tab  Active   albuterol 108 (90 Base) MCG/ACT Aero Soln inhalation aerosol  Active   fluticasone (FLOVENT HFA) 220 MCG/ACT Aerosol  Active                    ALLERGIES  No Known Allergies    PHYSICAL EXAM  VITAL SIGNS: /67   Pulse (!) 103   Temp 36.7 °C (98 °F) (Temporal)   Resp 18   Ht 1.676 m (5' 6\")   Wt 54 kg (119 lb)   SpO2 95%   BMI 19.21 kg/m²    Constitutional: Tachypnea with increased work of breathing, not acutely " ill-appearing otherwise  HENT: Normocephalic, no obvious evidence of acute trauma.  Eyes: No scleral icterus. Normal conjunctiva   Thorax & Lungs: Expiratory wheezing in all lung fields.  Tachypnea..   Abdomen: The abdomen is not visibly distended. Upon palpation, I find it to be without tenderness.  No mass appreciated.  Skin: The exposed portions of skin reveal no obvious rash or other abnormalities.  Extremities/Musculoskeletal: No obvious sign of acute trauma. No asymmetric calf tenderness or edema.   Neurologic: Alert & oriented. No focal deficits observed.      DIAGNOSTIC STUDIES / PROCEDURES    LABS  Results for orders placed or performed during the hospital encounter of 24   EKG   Result Value Ref Range    Report       [facility] Emergency Dept.    Test Date:  2024  Pt Name:    YAMILEX CARREON               Department: ER  MRN:        [id number]                      Room:       [facility]  Gender:     Male                         Technician: [id number]  :        1990                   Requested By:ER TRIAGE PROTOCOL  Order #:    [id number]                    Reading MD: VJ MONTELONGO MD    Measurements  Intervals                                Axis  Rate:       100                          P:          81  VT:         121                          QRS:        96  QRSD:       99                           T:          57  QT:         337  QTc:        435    Interpretive Statements  Sinus tachycardia  Borderline right axis deviation  ST elev, probable normal early repol pattern  Compared to ECG 10/04/2023 19:33:03  ST (T wave) deviation now present  Sinus rhythm no longer present  I independently interpreted this EKG  Electronically Signed On 2024 16:59:52 PST by VJ SHAW MD     POC CoV-2, FLU A/B, RSV by PCR   Result Value Ref Range    POC Influenza A RNA, PCR Negative Negative    POC Influenza B RNA, PCR Negative Negative    POC RSV, by PCR Negative Negative    POC  SARS-CoV-2, PCR NotDetected         RADIOLOGY  I have independently interpreted the diagnostic imaging associated with this visit and am waiting the final reading from the radiologist.   My preliminary interpretation is as follows: No infiltrate, no pneumothorax  Radiologist interpretation:   DX-CHEST-PORTABLE (1 VIEW)   Final Result      1.  No acute cardiac or pulmonary abnormalities are identified.            COURSE & MEDICAL DECISION MAKING    ED Observation Status? No; Patient does not meet criteria for ED Observation.     INITIAL ASSESSMENT, COURSE AND PLAN  Care Narrative: This is a 33-year-old male patient with asthma coming in with an asthma exacerbation, ran out of his albuterol at home, significantly hypoxic on room air upon arrival of EMS at 78%, improved after 2 breathing treatments during transport, also received Solu-Medrol.  Although upon arrival still has significant wheezing so he received another breathing treatment.  Recent sick contact with influenza, his viral swabs are negative.  His x-ray here excludes infiltrate and pneumothorax.  He is reevaluated after the third breathing treatment, still hypoxic at 87% and still wheezing.  Will receive another breathing treatment.  He will receive some IV fluids and some magnesium and he will be reevaluated    He had another breathing treatment, he remains wheezy, he remains hypoxic.  At this point, he is received 4 breathing treatments total, he is still hypoxic and wheezing will be admitted to the hospital for further evaluation and care.      CRITICAL CARE  The very real possibilty of a deterioration of this patient's condition required the highest level of my preparedness for sudden, emergent intervention.  I provided critical care services, which included medication orders, frequent reevaluations of the patient's condition and response to treatment, ordering and reviewing test results, and discussing the case with various consultants.  The critical  care time associated with the care of the patient was 39 minutes. Review chart for interventions. This time is exclusive of any other billable procedures.       DISPOSITION AND DISCUSSIONS  I have discussed management of the patient with the following physicians and AD's: Dr. Alva, admitting resident      FINAL DIAGNOSIS  1. Acute respiratory failure with hypoxia (HCC)    2. Severe persistent asthma with acute exacerbation           Electronically signed by: Kaden Landry M.D., 3/5/2024 5:00 PM

## 2024-03-06 NOTE — CARE PLAN
Problem: Bronchoconstriction  Goal: Improve in air movement and diminished wheezing  Description: Target End Date:  2 to 3 days    1.  Implement inhaled treatments  2.  Evaluate and manage medication effects  Outcome: Progressing   Duoneb Q4  Asthma exacerbation

## 2024-03-06 NOTE — ASSESSMENT & PLAN NOTE
Smokes daily due to stress with 2 kids    Discussed at length in regards to cessation, given patient's moderate to severe asthma    -Tobacco cessation counseling and education provided for approximately 3 minutes. Nicotine replacement options provided including patch, lozenges, gum. Further medical treatments including Wellbutrin and Chantix. Discussed the benefits of quitting smoking and risks of continued smoking including cardiovascular disease, cancer and COPD. Declines therapy at this time

## 2024-03-06 NOTE — PROGRESS NOTES
Came in to give pt meds per MAR, O2 sats decreased upon wakening to 70s- low 80s. Put on 0.5L NC, O2 increased back to 99%. Taken off NC, O2 sats decreased to 67% lowest within a few minutes. Expiratory wheezing noted on auscultation. Hourly rounding in place.

## 2024-03-06 NOTE — ED NOTES
Pharmacy Medication Reconciliation      ~Medication reconciliation updated and complete per patient at bedside.   ~Allergies have been verified and updated   ~No oral ABX within the last 30 days  ~Patient home pharmacy :  [facility]      ~Anticoagulants (rivaroxaban, apixaban, edoxaban, dabigatran, warfarin, enoxaparin) taken in the last 14 days? NO  ~Anticoagulant: N/A Last dose: N/A

## 2024-03-06 NOTE — DISCHARGE PLANNING
6028  MELISA sent Nebulizer order to [facility] @1002 per choice form received at [phone]. [phone]  Agency/Facility Name: [facility]   Spoke To: [place]   Outcome: MELISA called to verify order received and the cost of the nebulizer. Per [place] the cost of the nebulizer out of pocket is $100 plus tax.     GAYATHRI Acevedo notified.

## 2024-03-06 NOTE — PROGRESS NOTES
Handoff report received from ED nurse. Pt transferred to unit and care assumed. Pt is currently resting in bed awake and NAD noted. POC discussed with Pt and Pt verbalizes no questions or needs at this time. Pt is AAOx4, on 2L NC, on Tele monitoring with rate and rhythm verified, and VSS. Call light and belongings within reach, bed in lowest and locked position, fall precautions in place, and Pt educated on use of call light. Hourly rounding in place.

## 2024-03-06 NOTE — CARE PLAN
The patient is Stable - Low risk of patient condition declining or worsening    Shift Goals  Clinical Goals: wean O2, control SOB s/s, decrease need for nebulizer trteatments  Patient Goals: live  Family Goals: RACHEL    Progress made toward(s) clinical / shift goals:    Problem: Impaired Gas Exchange  Goal: Patient will demonstrate improved ventilation and adequate oxygenation and participate in treatment regimen within the level of ability/situation.  Outcome: Progressing     Problem: Self Care  Goal: Patient will have the ability to perform ADLs independently or with assistance (bathe, groom, dress, toilet and feed)  Outcome: Progressing     Problem: Knowledge Deficit - Standard  Goal: Patient and family/care givers will demonstrate understanding of plan of care, disease process/condition, diagnostic tests and medications  Outcome: Progressing     Pt O2 saturation is maintaining above 90% while decreasing O2 LPM by 0.5 increments as tolerated. Pt ambulates independently to use restroom as needed. Discussed with pt importance of keeping home medications up to date and in sufficient supply to prevent a recurring incident as this current one.

## 2024-03-06 NOTE — ED TRIAGE NOTES
Sree Jack  33 y.o. male  Chief Complaint   Patient presents with    Shortness of Breath     Started around 1300 today. Run out of albuterol inhaler at home. Wheezing on all lung fields. Received Solumedrol 125 mg IV and Duoneb x2 and albuterol x1 from [facility]. Was 78% on RA. 098% on 4 LPM. Kids had influenza a week ago.      Pt BIB EMS from home.    Pt is GCS 15, speaking in full sentences, follows commands and responds appropriately to questions.     Vitals:    03/05/24 1613   BP: 121/67   Pulse: (!) 121   Resp: (!) 26   Temp: 36.7 °C (98 °F)   SpO2: 98%

## 2024-03-06 NOTE — FACE TO FACE
Face to Face Note  -  Durable Medical Equipment    Estella Shelley D.O. - NPI: [id number]  I certify that this patient is under my care and that they had a durable medical equipment(DME)face to face encounter by myself that meets the physician DME face-to-face encounter requirements with this patient on:    Date of encounter:   Patient:                    MRN:                       YOB: 2023  Sree Jack  [id number]  1990     The encounter with the patient was in whole, or in part, for the following medical condition, which is the primary reason for durable medical equipment:  Asthma    I certify that, based on my findings, the following durable medical equipment is medically necessary:    Nebulizer.    My Clinical findings support the need for the above equipment due to:  Wheezing (Chronic)

## 2024-03-06 NOTE — H&P
[facility] Internal Medicine History & Physical Note    Date of Service  3/6/2024    [facility] Team: JUAN CARLOS   Attending: Dr. Motta  Senior Resident: Dr. Alva    Contact Number: [phone]    Primary Care Physician  Pcp Pt States None    Consultants  NA    Specialist Names: NA    Code Status  Full Code    Chief Complaint  Chief Complaint   Patient presents with    Shortness of Breath     Started around 1300 today. Run out of albuterol inhaler at home. Wheezing on all lung fields. Received Solumedrol 125 mg IV and Duoneb x2 and albuterol x1 from [facility]. Was 78% on RA. 098% on 4 LPM. Kids had influenza a week ago.        History of Presenting Illness (HPI):   Patient is a 33 y.o. male with past medical history of  ADHD, asthma, recent ED visit on 10/2023 for asthma exacerbation, who presented to the ED on 3/5/2024 for shortness of breath.    Started 1-2 p.m., didn't call EMS until 3 p.m. because he had to  his child. Similar to previous asthma attacks. Has been taking 4-6 puffs per day of albuterol. Would wake up 2 times a night short of breath for about the past week. Denies any history of allergies, does have 1 dog, 2 kids, no birds, no GERD. Denies any recent hospitalization or history of intubation. Does not have a pulmonologist or primary care provider, denies prior history of PFT. Moved here a year ago, using albuterol from provider in [place]. Has associated hot flashes, slight chills, nonproductive cough, pleuritic chest pain, palpitations, CLAROS with dizziness/lightheadedness.     Vapes once a day when he gets stressed about his 2 kids, denies any alcohol use. Denies any recreational substance use. Couch surfing, living with girlfriend's couch at the moment.    In the ED, tachycardic 100-120s, tachypneic 20s, 98% on 4 L nebulizer.  Desatted to 88% on room air.  WBC 13, CMP unremarkable, CR 0.70, troponin <6, respiratory viral panel negative, CXR with no acute cardiac or pulm abnormalities.  EKG ST, QTc 435.   Patient received NS 1L, DuoNeb, albuterol in the ED.  Patient will be admitted for management of acute hypoxic respiratory failure secondary to asthma exacerbation.      I discussed the plan of care with patient, family, bedside RN, and pharmacy.    Review of Systems  ROS  Review of systems (+12 systems) unremarkable except for concerns noted by patient or items listed.     Please see HPI for additional ROS.    Past Medical History   has a past medical history of Asthma.    Surgical History   has no past surgical history on file.     Family History  family history is not on file.   Family history reviewed with patient.     Social History  Tobacco: Please see HPI  Alcohol: Please see HPI  Recreational drugs (illegal or prescription): Please see HPI  Employment: Please see HPI  Living Situation: Please see HPI  Recent Travel: Please see HPI  Primary Care Provider: Reviewed    Allergies  No Known Allergies    Medications  Prior to Admission Medications   Prescriptions Last Dose Informant Patient Reported? Taking?   acetaminophen (TYLENOL) 500 MG Tab   No No   Sig: Take 1-2 Tablets by mouth every 6 hours as needed for Mild Pain or Moderate Pain.   albuterol 108 (90 Base) MCG/ACT Aero Soln inhalation aerosol   No No   Sig: Inhale 2 Puffs by mouth every 6 hours as needed for Shortness of Breath.   fluticasone (FLOVENT HFA) 220 MCG/ACT Aerosol   No No   Sig: Inhale 1 Puff by mouth 2 times a day.      Facility-Administered Medications: None       Physical Exam  Temp:  [36.1 °C (97 °F)-36.7 °C (98 °F)] 36.1 °C (97 °F)  Pulse:  [103-125] 116  Resp:  [16-32] 26  BP: ()/(57-92) 123/92  SpO2:  [88 %-98 %] 95 %  Blood Pressure: 108/71   Temperature: 36.7 °C (98 °F)   Pulse: (!) 118   Respiration: (!) 26   Pulse Oximetry: 91 %       Physical Exam  Vitals and nursing note reviewed.   Constitutional:       Appearance: Normal appearance.   HENT:      Head: Normocephalic and atraumatic.      Right Ear: External ear normal.       "Left Ear: External ear normal.      Nose: Nose normal.      Mouth/Throat:      Mouth: Mucous membranes are moist.      Pharynx: Oropharynx is clear.   Eyes:      Extraocular Movements: Extraocular movements intact.      Pupils: Pupils are equal, round, and reactive to light.   Cardiovascular:      Rate and Rhythm: Regular rhythm. Tachycardia present.      Pulses: Normal pulses.      Heart sounds: Normal heart sounds.   Pulmonary:      Effort: Respiratory distress present.      Breath sounds: Wheezing (diffuse) present.   Abdominal:      General: There is no distension.      Palpations: Abdomen is soft.      Tenderness: There is no abdominal tenderness. There is no guarding or rebound.   Musculoskeletal:         General: Normal range of motion.      Cervical back: Normal range of motion.   Skin:     General: Skin is warm.      Capillary Refill: Capillary refill takes less than 2 seconds.   Neurological:      General: No focal deficit present.      Mental Status: He is alert and oriented to person, place, and time.   Psychiatric:         Mood and Affect: Mood normal.         Behavior: Behavior normal.         Thought Content: Thought content normal.         Judgment: Judgment normal.         Laboratory:          No results for input(s): \"ALTSGPT\", \"ASTSGOT\", \"ALKPHOSPHAT\", \"TBILIRUBIN\", \"DBILIRUBIN\", \"GAMMAGT\", \"AMYLASE\", \"LIPASE\", \"ALB\", \"PREALBUMIN\", \"GLUCOSE\" in the last 72 hours.      No results for input(s): \"NTPROBNP\" in the last 72 hours.      No results for input(s): \"TROPONINT\" in the last 72 hours.    Imaging:  DX-CHEST-PORTABLE (1 VIEW)   Final Result      1.  No acute cardiac or pulmonary abnormalities are identified.          X-Ray:  I have personally reviewed the images and compared with prior images.  EKG:  I have personally reviewed the images and compared with prior images.    Assessment/Plan:  Problem Representation:   I anticipate this patient will require at least two midnights for appropriate " medical management, necessitating inpatient admission because for management of acute hypoxic respiratory failure secondary to asthma exacerbation    Patient will need a Med/Surg bed on EMERGENCY service .  The need is secondary to acute hypoxic respiratory failure secondary to asthma exacerbation.    * Acute hypoxic respiratory failure (HCC)- (present on admission)  Assessment & Plan  2/2 asthma exacerbation, moderate in severity, requiring 4L O2    -Telemetry monitoring  -Supplemental O2 for O2 saturation greater than 92%  -RT protocol  -Continue with DuoNebs every 4 hours  -Will give methylprednisolone 40 mg IVP now, prednisone 40 mg X 4 days  -Absolute eosinophil of 190, will order serum IGE to help direct future outpatient therapy if maintenance inhaler therapy insufficient  -Start Dulera 2 puffs daily  -Will need PFT and establish with PCP    Moderate persistent asthma with acute exacerbation  Assessment & Plan    -Management as above for acute hypoxic respiratory failure    Leukocytosis  Assessment & Plan  WBC 13, likely reactive    -Continue to monitor for signs of infection    Vaping nicotine dependence, tobacco product  Assessment & Plan  Smokes daily due to stress with 2 kids    -Tobacco cessation counseling and education provided for approximately 3 minutes. Nicotine replacement options provided including patch, lozenges, gum. Further medical treatments including Wellbutrin and Chantix. Discussed the benefits of quitting smoking and risks of continued smoking including cardiovascular disease, cancer and COPD. Declines therapy at this time    Homelessness  Assessment & Plan  Couch surfaces    -Social work consult for housing resources        VTE prophylaxis: enoxaparin ppx

## 2024-03-06 NOTE — PROGRESS NOTES
4 Eyes Skin Assessment Completed by RAINA Sánchez and RAINA Mantilla.    Head WDL  Ears WDL  Nose WDL  Mouth WDL  Neck WDL  Breast/Chest redness, blanching  Shoulder Blades WDL  Spine WDL  (R) Arm/Elbow/Hand WDL  (L) Arm/Elbow/Hand Redness and Blanching  Abdomen WDL  Groin WDL  Scrotum/Coccyx/Buttocks WDL  (R) Leg WDL  (L) Leg WDL  (R) Heel/Foot/Toe WDL  (L) Heel/Foot/Toe WDL          Devices In Places ECG, Tele Box, Blood Pressure Cuff, and Pulse Ox      Interventions In Place Pillows    Possible Skin Injury No    Pictures Uploaded Into Epic N/A  Wound Consult Placed N/A  RN Wound Prevention Protocol Ordered No

## 2024-03-06 NOTE — PROGRESS NOTES
Hospital Medicine Daily Progress Note    Date of Service  3/6/2024    Chief Complaint  Sree Jack is a 33 y.o. male admitted 3/5/2024 with shortness of breath    Hospital Course  This is a 33-year-old male with past medical history of asthma and ADHD who was admitted on 3/5/2024 with acute hypoxemic respiratory failure secondary to asthma exacerbation.    COVID, influenza, RSV negative.  Chest x-ray with no evidence of pulmonary infiltrate or effusion.  Labs without any evidence of eosinophilia.  Discussed at length in regards to tobacco and vaping cessation.  Patient started on steroid and nebulizer treatments.  Patient provided with referral to pulmonology.  Nebulizer ordered.    Interval Problem Update  COVID, influenza, RSV negative.  Chest x-ray with no evidence of pulmonary infiltrate or effusion.  Labs without any evidence of eosinophilia.  Discussed at length in regards to tobacco and vaping cessation.      Patient started on steroid and nebulizer treatments.  Patient provided with referral to pulmonology.  Nebulizer ordered.    Patient initially requiring about 4 L oxygen, now down to 2 L of oxygen.  Started patient on Singulair and Dulera.    I have discussed this patient's plan of care and discharge plan at IDT rounds today with Case Management, Nursing, Nursing leadership, and other members of the IDT team.    Consultants/Specialty  None    Code Status  Full Code    Disposition  The patient is not medically cleared for discharge to home or a post-acute facility.  Anticipate discharge to: home with close outpatient follow-up    I have placed the appropriate orders for post-discharge needs.    Review of Systems  Review of Systems   Respiratory:  Positive for cough, shortness of breath and wheezing.    All other systems reviewed and are negative.       Physical Exam  Temp:  [36.1 °C (97 °F)-36.7 °C (98.1 °F)] 36.7 °C (98.1 °F)  Pulse:  [100-145] 145  Resp:  [16-32] 20  BP: ()/(57-92)  117/71  SpO2:  [73 %-100 %] 92 %    Physical Exam  Vitals and nursing note reviewed.   Constitutional:       General: He is not in acute distress.     Appearance: He is ill-appearing.   HENT:      Head: Normocephalic and atraumatic.   Eyes:      Extraocular Movements: Extraocular movements intact.      Conjunctiva/sclera: Conjunctivae normal.      Pupils: Pupils are equal, round, and reactive to light.   Cardiovascular:      Rate and Rhythm: Regular rhythm. Tachycardia present.      Pulses: Normal pulses.      Heart sounds: No murmur heard.     No friction rub. No gallop.   Pulmonary:      Effort: Pulmonary effort is normal. No respiratory distress.      Breath sounds: Wheezing present. No rhonchi or rales.   Abdominal:      General: Bowel sounds are normal. There is no distension.      Palpations: Abdomen is soft.      Tenderness: There is no abdominal tenderness.   Musculoskeletal:         General: No swelling or tenderness. Normal range of motion.      Cervical back: Normal range of motion and neck supple. No muscular tenderness.      Right lower leg: No edema.      Left lower leg: No edema.   Skin:     General: Skin is warm and dry.      Capillary Refill: Capillary refill takes less than 2 seconds.      Findings: No bruising, erythema or rash.   Neurological:      General: No focal deficit present.      Mental Status: He is alert and oriented to person, place, and time.         Fluids    Intake/Output Summary (Last 24 hours) at 3/6/2024 1247  Last data filed at 3/6/2024 0900  Gross per 24 hour   Intake 570 ml   Output --   Net 570 ml       Laboratory  Recent Labs     03/06/24  0312   WBC 10.4   RBC 4.47*   HEMOGLOBIN 14.1   HEMATOCRIT 41.3*   MCV 92.4   MCH 31.5   MCHC 34.1   RDW 41.8   PLATELETCT 305   MPV 9.9     Recent Labs     03/06/24  0312   SODIUM 138   POTASSIUM 3.6   CHLORIDE 104   CO2 22   GLUCOSE 162*   BUN 10   CREATININE 0.62   CALCIUM 8.8                   Imaging  DX-CHEST-PORTABLE (1 VIEW)    Final Result      1.  No acute cardiac or pulmonary abnormalities are identified.           Assessment/Plan  * Acute hypoxic respiratory failure (HCC)- (present on admission)  Assessment & Plan  2/2 asthma exacerbation, moderate in severity, requiring 4L O2, now 2L    -Supplemental O2 for O2 saturation greater than 92%  -RT protocol  -Continue with Xopenex every 4 hours  - Solumedrol 40mg Q8H today, Q12 tomorrow, wean with prednisone  -Started singular and dulera    Patient will need to be established with outpatient pulmonology and have PFTs performed  Nebulizer ordered    Moderate persistent asthma with acute exacerbation  Assessment & Plan    -Management as above for acute hypoxic respiratory failure    Vaping nicotine dependence, tobacco product  Assessment & Plan  Smokes daily due to stress with 2 kids    Discussed at length in regards to cessation, given patient's moderate to severe asthma    -Tobacco cessation counseling and education provided for approximately 3 minutes. Nicotine replacement options provided including patch, lozenges, gum. Further medical treatments including Wellbutrin and Chantix. Discussed the benefits of quitting smoking and risks of continued smoking including cardiovascular disease, cancer and COPD. Declines therapy at this time    Homelessness  Assessment & Plan  Couch surfaces    -Social work consult for housing resources    Leukocytosis  Assessment & Plan  Resolved         VTE prophylaxis:     I have performed a physical exam and reviewed and updated ROS and Plan today (3/6/2024). In review of yesterday's note (3/5/2024), there are no changes except as documented above.    Greater than 51 minutes spent prepping to see patient (e.g. reviewing EMR) obtaining and/or reviewing separately obtained history. Performing a medically appropriate examination and evaluation. Independently interpreting results and communicating results to patient/family/caregiver. Counseling and educating the  patient/family/caregiver. Ordering medications, tests, and/or procedures. Referring and communicating with other health care professionals.  Documenting clinical information in EPIC. Care coordination.

## 2024-03-06 NOTE — HOSPITAL COURSE
This is a 33-year-old male with past medical history of asthma and ADHD who was admitted on 3/5/2024 with acute hypoxemic respiratory failure secondary to asthma exacerbation.    COVID, influenza, RSV negative.  Chest x-ray with no evidence of pulmonary infiltrate or effusion.  Labs without any evidence of eosinophilia.  Discussed at length in regards to tobacco and vaping cessation.  Patient started on steroid and nebulizer treatments.  Patient provided with referral to pulmonology.  Nebulizer ordered.  Meds to beds sent for all his new inhalers and steroid therapy.

## 2024-03-06 NOTE — DISCHARGE PLANNING
Case Management Discharge Planning    Admission Date: 3/5/2024  GMLOS: 2.9  ALOS: 1    6-Clicks ADL Score: 24  6-Clicks Mobility Score: 24    Anticipated Discharge Dispo: Discharge Disposition: Discharged to home/self care (01)    DME Needed: Yes    DME Ordered: Yes    Action(s) Taken:    @ 0950: MELVIN met with pt at bedside to discuss DME, nebulizer, need. MELVIN explained that since pt has no insurance, he would have to pay for it out-of-pocket. Per pt, he was agreeable depending on the price. Per pt, he gave choice for [facility]. MELVIN faxed choice form to [facility].    @ 1400: MELVIN met with pt at bedside to discuss DME cost and assessment. Per pt, he currently has no money/source of income. Pt is currently couch surfing and living with his S/O, Crystal, at [address] [place] [id number]. Pt's current phone number is [phone]. MELVIN reached out to leadership regarding pt's DME need and lack of source of income.     @ 1537: MELVIN asked the MD to see if nebulizer can be sent as meds to beds for pt for approved services. Per MD, unsure if nebulizers can be done through meds to beds. MELVIN reached out to [facility]. Per Erik, [facility] does not have nebulizers. MELVIN reached out to MD regarding potential need to change to inhalers d/t lack of insurance and lack of income.    Escalations Completed: Leadership    Medically Clear: No    Next Steps: F/U with medical team to discuss discharge needs and barriers. F/U with nebulizer need.    Barriers to Discharge: Medical clearance and DME

## 2024-03-06 NOTE — ASSESSMENT & PLAN NOTE
2/2 asthma exacerbation, moderate in severity, requiring 4L O2, now 2L    -Supplemental O2 for O2 saturation greater than 92%  -RT protocol  -Continue with Xopenex every 4 hours  - Solumedrol 40mg Q8H today, Q12 tomorrow, wean with prednisone  -Started singular and dulera    Patient will need to be established with outpatient pulmonology and have PFTs performed  Nebulizer ordered

## 2024-03-06 NOTE — ED NOTES
Patient transported to T729 by RN with full vitals monitoring. On O2 at 2, Aox4. Patient care transferred.

## 2024-03-07 ENCOUNTER — PHARMACY VISIT (OUTPATIENT)
Dept: PHARMACY | Facility: MEDICAL CENTER | Age: 34
End: 2024-03-07
Payer: COMMERCIAL

## 2024-03-07 VITALS
HEART RATE: 88 BPM | OXYGEN SATURATION: 94 % | HEIGHT: 66 IN | TEMPERATURE: 98.4 F | WEIGHT: 121.25 LBS | SYSTOLIC BLOOD PRESSURE: 112 MMHG | RESPIRATION RATE: 18 BRPM | DIASTOLIC BLOOD PRESSURE: 75 MMHG | BODY MASS INDEX: 19.49 KG/M2

## 2024-03-07 LAB
D DIMER PPP IA.FEU-MCNC: <0.27 UG/ML (FEU) (ref 0–0.5)
IGE SERPL-ACNC: 1358 KU/L

## 2024-03-07 PROCEDURE — 700111 HCHG RX REV CODE 636 W/ 250 OVERRIDE (IP): Mod: JZ | Performed by: GENERAL PRACTICE

## 2024-03-07 PROCEDURE — A9270 NON-COVERED ITEM OR SERVICE: HCPCS

## 2024-03-07 PROCEDURE — 700102 HCHG RX REV CODE 250 W/ 637 OVERRIDE(OP)

## 2024-03-07 PROCEDURE — 700101 HCHG RX REV CODE 250: Performed by: GENERAL PRACTICE

## 2024-03-07 PROCEDURE — 99239 HOSP IP/OBS DSCHRG MGMT >30: CPT | Performed by: GENERAL PRACTICE

## 2024-03-07 PROCEDURE — RXMED WILLOW AMBULATORY MEDICATION CHARGE: Performed by: GENERAL PRACTICE

## 2024-03-07 PROCEDURE — 85379 FIBRIN DEGRADATION QUANT: CPT

## 2024-03-07 PROCEDURE — 94640 AIRWAY INHALATION TREATMENT: CPT

## 2024-03-07 RX ORDER — BENZONATATE 100 MG/1
100 CAPSULE ORAL 3 TIMES DAILY PRN
Status: DISCONTINUED | OUTPATIENT
Start: 2024-03-07 | End: 2024-03-07 | Stop reason: HOSPADM

## 2024-03-07 RX ORDER — ALBUTEROL SULFATE 90 UG/1
2 AEROSOL, METERED RESPIRATORY (INHALATION) EVERY 6 HOURS PRN
Qty: 8.5 G | Refills: 0 | Status: SHIPPED | OUTPATIENT
Start: 2024-03-07

## 2024-03-07 RX ORDER — METHYLPREDNISOLONE SODIUM SUCCINATE 40 MG/ML
40 INJECTION, POWDER, LYOPHILIZED, FOR SOLUTION INTRAMUSCULAR; INTRAVENOUS ONCE
Status: COMPLETED | OUTPATIENT
Start: 2024-03-07 | End: 2024-03-07

## 2024-03-07 RX ORDER — PREDNISONE 20 MG/1
40 TABLET ORAL DAILY
Qty: 10 TABLET | Refills: 0 | Status: SHIPPED | OUTPATIENT
Start: 2024-03-08 | End: 2024-03-13

## 2024-03-07 RX ORDER — GUAIFENESIN/DEXTROMETHORPHAN 100-10MG/5
10 SYRUP ORAL EVERY 6 HOURS PRN
Status: DISCONTINUED | OUTPATIENT
Start: 2024-03-07 | End: 2024-03-07 | Stop reason: HOSPADM

## 2024-03-07 RX ORDER — ALBUTEROL SULFATE 2.5 MG/3ML
2.5 SOLUTION RESPIRATORY (INHALATION) EVERY 4 HOURS PRN
Qty: 360 ML | Refills: 0 | Status: SHIPPED | OUTPATIENT
Start: 2024-03-07 | End: 2024-04-06

## 2024-03-07 RX ORDER — MONTELUKAST SODIUM 10 MG/1
10 TABLET ORAL NIGHTLY
Qty: 60 TABLET | Refills: 0 | Status: SHIPPED | OUTPATIENT
Start: 2024-03-07 | End: 2024-05-06

## 2024-03-07 RX ORDER — BENZONATATE 100 MG/1
100 CAPSULE ORAL 3 TIMES DAILY PRN
Qty: 60 CAPSULE | Refills: 0 | Status: SHIPPED | OUTPATIENT
Start: 2024-03-07

## 2024-03-07 RX ORDER — PREDNISONE 20 MG/1
40 TABLET ORAL DAILY
Status: DISCONTINUED | OUTPATIENT
Start: 2024-03-08 | End: 2024-03-07 | Stop reason: HOSPADM

## 2024-03-07 RX ADMIN — IPRATROPIUM BROMIDE 0.5 MG: 0.5 SOLUTION RESPIRATORY (INHALATION) at 07:19

## 2024-03-07 RX ADMIN — IPRATROPIUM BROMIDE 0.5 MG: 0.5 SOLUTION RESPIRATORY (INHALATION) at 14:17

## 2024-03-07 RX ADMIN — LEVALBUTEROL 1.25 MG: 1.25 SOLUTION RESPIRATORY (INHALATION) at 10:01

## 2024-03-07 RX ADMIN — METHYLPREDNISOLONE SODIUM SUCCINATE 40 MG: 40 INJECTION, POWDER, FOR SOLUTION INTRAMUSCULAR; INTRAVENOUS at 05:42

## 2024-03-07 RX ADMIN — MOMETASONE FUROATE AND FORMOTEROL FUMARATE DIHYDRATE 2 PUFF: 200; 5 AEROSOL RESPIRATORY (INHALATION) at 04:10

## 2024-03-07 RX ADMIN — METHYLPREDNISOLONE SODIUM SUCCINATE 40 MG: 40 INJECTION, POWDER, FOR SOLUTION INTRAMUSCULAR; INTRAVENOUS at 11:47

## 2024-03-07 RX ADMIN — LEVALBUTEROL 1.25 MG: 1.25 SOLUTION RESPIRATORY (INHALATION) at 14:18

## 2024-03-07 RX ADMIN — LEVALBUTEROL 1.25 MG: 1.25 SOLUTION RESPIRATORY (INHALATION) at 07:19

## 2024-03-07 RX ADMIN — GUAIFENESIN SYRUP AND DEXTROMETHORPHAN 10 ML: 100; 10 SYRUP ORAL at 05:43

## 2024-03-07 RX ADMIN — IPRATROPIUM BROMIDE 0.5 MG: 0.5 SOLUTION RESPIRATORY (INHALATION) at 10:00

## 2024-03-07 ASSESSMENT — FIBROSIS 4 INDEX: FIB4 SCORE: 0.63

## 2024-03-07 NOTE — DISCHARGE SUMMARY
Discharge Summary    CHIEF COMPLAINT ON ADMISSION  Chief Complaint   Patient presents with    Shortness of Breath     Started around 1300 today. Run out of albuterol inhaler at home. Wheezing on all lung fields. Received Solumedrol 125 mg IV and Duoneb x2 and albuterol x1 from [facility]. Was 78% on RA. 098% on 4 LPM. Kids had influenza a week ago.        Reason for Admission  EMS     Admission Date  3/5/2024    CODE STATUS  Full Code    HPI & HOSPITAL COURSE  This is a 33-year-old male with past medical history of asthma and ADHD who was admitted on 3/5/2024 with acute hypoxemic respiratory failure secondary to asthma exacerbation.    COVID, influenza, RSV negative.  Chest x-ray with no evidence of pulmonary infiltrate or effusion.  Labs without any evidence of eosinophilia.  Discussed at length in regards to tobacco and vaping cessation.  Patient started on steroid and nebulizer treatments.  Patient provided with referral to pulmonology.  Nebulizer ordered.  Meds to beds sent for all his new inhalers and steroid therapy.    Therefore, he is discharged in good and stable condition to home with close outpatient follow-up.    The patient met 2-midnight criteria for an inpatient stay at the time of discharge.    Discharge Date  3/7/2024    FOLLOW UP ITEMS POST DISCHARGE  Primary care physician  Pulmonology    DISCHARGE DIAGNOSES  Principal Problem:    Acute hypoxic respiratory failure (HCC) (POA: Yes)  Active Problems:    Moderate persistent asthma with acute exacerbation (POA: Unknown)    Leukocytosis (POA: Unknown)    Homelessness (POA: Unknown)    Vaping nicotine dependence, tobacco product (POA: Unknown)  Resolved Problems:    * No resolved hospital problems. *      FOLLOW UP  [facility] - PULMONARY MEDICINE  [address]  [facility] [address]  [phone]  Follow up      [facility] ([facility]) - Primary Care and Family Medicine  [facility]  [address]  [phone]  Follow up        MEDICATIONS ON DISCHARGE     Medication List        START taking these medications        Instructions   benzonatate 100 MG Caps  Commonly known as: Tessalon   Take 1 Capsule by mouth 3 times a day as needed for Cough.  Dose: 100 mg     mometasone-formoterol 200-5 MCG/ACT Aero  Commonly known as: Dulera   Inhale 2 Puffs 2 times a day for 60 days.  Dose: 2 Puff     montelukast 10 MG Tabs  Commonly known as: Singulair   Take 1 Tablet by mouth every evening for 60 days.  Dose: 10 mg     predniSONE 20 MG Tabs  Start taking on: March 8, 2024  Commonly known as: Deltasone   Take 2 Tablets by mouth every day for 5 days.  Dose: 40 mg            CHANGE how you take these medications        Instructions   * albuterol 108 (90 Base) MCG/ACT Aers inhalation aerosol  What changed: Another medication with the same name was added. Make sure you understand how and when to take each.   Inhale 2 Puffs every 6 hours as needed for Shortness of Breath.  Dose: 2 Puff     * albuterol 2.5mg/3ml Nebu solution for nebulization  What changed: You were already taking a medication with the same name, and this prescription was added. Make sure you understand how and when to take each.  Commonly known as: Proventil   Take 3 mL by nebulization every four hours as needed for Shortness of Breath for up to 30 days.  Dose: 2.5 mg           * This list has 2 medication(s) that are the same as other medications prescribed for you. Read the directions carefully, and ask your doctor or other care provider to review them with you.                STOP taking these medications      acetaminophen 500 MG Tabs  Commonly known as: Tylenol     NON SPECIFIED              Allergies  No Known Allergies    DIET  Orders Placed This Encounter   Procedures    Diet Order Diet: Regular     Standing Status:   Standing     Number of Occurrences:   1     Order Specific Question:   Diet:     Answer:   Regular [1]       ACTIVITY  As  tolerated.  Weight bearing as tolerated    CONSULTATIONS  None    PROCEDURES  None    LABORATORY  Lab Results   Component Value Date    SODIUM 138 03/06/2024    POTASSIUM 3.6 03/06/2024    CHLORIDE 104 03/06/2024    CO2 22 03/06/2024    GLUCOSE 162 (H) 03/06/2024    BUN 10 03/06/2024    CREATININE 0.62 03/06/2024        Lab Results   Component Value Date    WBC 10.4 03/06/2024    HEMOGLOBIN 14.1 03/06/2024    HEMATOCRIT 41.3 (L) 03/06/2024    PLATELETCT 305 03/06/2024      DX-CHEST-PORTABLE (1 VIEW)   Final Result      1.  No acute cardiac or pulmonary abnormalities are identified.           Total time of the discharge process exceeds 45 minutes.

## 2024-03-07 NOTE — CARE PLAN
The patient is Watcher - Medium risk of patient condition declining or worsening    Shift Goals  Clinical Goals: wean 02, monitor respiratory status  Patient Goals: rest, comfort  Family Goals: RACHEL    Progress made toward(s) clinical / shift goals:        Problem: Impaired Gas Exchange  Goal: Patient will demonstrate improved ventilation and adequate oxygenation and participate in treatment regimen within the level of ability/situation.  Outcome: Progressing     Problem: Knowledge Deficit - Standard  Goal: Patient and family/care givers will demonstrate understanding of plan of care, disease process/condition, diagnostic tests and medications  Outcome: Progressing       Patient is not progressing towards the following goals:

## 2024-03-07 NOTE — CARE PLAN
Problem: Bronchoconstriction  Goal: Improve in air movement and diminished wheezing  Description: Target End Date:  2 to 3 days    1.  Implement inhaled treatments  2.  Evaluate and manage medication effects  Outcome: Progressing       Respiratory Update    Treatment modality: Xopenex and atrovent  Frequency: QID    Pt tolerating current treatments well with no adverse reactions.

## 2024-03-07 NOTE — PROGRESS NOTES
Maryellen send to Dr. Ramon in error.     Patient needs appt for prescriptions.   Pt transported to [facility] by [facility] staff with belongings without incident.

## 2024-03-07 NOTE — PROGRESS NOTES
Pt ambulated entire hallway x2. O2 >90% on room air during entire ambulation, heart rate sustained in the 120's. MD notified.

## 2024-03-07 NOTE — CARE PLAN
The patient is Stable - Low risk of patient condition declining or worsening    Shift Goals  Clinical Goals: Safety  Patient Goals: Remain updated on POC    Progress made toward(s) clinical / shift goals:      Problem: Pain - Standard  Goal: Alleviation of pain or a reduction in pain to the patient’s comfort goal  Outcome: Progressing  Flowsheets (Taken 3/7/2024 4862)  Pain Rating Scale (NPRS): 0     Problem: Skin Integrity  Goal: Skin integrity is maintained or improved  Outcome: Progressing  Note: Appropriate interventions in place to maintain/ improve skin integrity.      Problem: Fall Risk  Goal: Patient will remain free from falls  Outcome: Progressing  Note: Appropriate fall precautions in place.

## 2024-03-07 NOTE — DISCHARGE PLANNING
1134  Agency/Facility Name: [facility]   Spoke To: Ashwini   Outcome: MELISA called to notify that order will be an approved service. Ashwini notified DPA that the nebulizer will be a total of $135 and as soon the approved service has been faxed to [facility] it will be processed and deliver.     GAYATHRI Acevedo notified.     1445  DPA hard fax approved service form to [facility] to fax number [phone].     1148  Agency/Facility Name: [facility]   Spoke To: Ashwini   Outcome: DPA called to notify approved service form faxed and if Nebulizer can be delivered at bedside.

## 2024-03-07 NOTE — DISCHARGE PLANNING
Meds-to-Beds: Discharge prescription orders listed below delivered to patient in discharge lounge. RN notified. Patient counseled.     Current Outpatient Medications   Medication Sig Dispense Refill    albuterol 108 (90 Base) MCG/ACT Aero Soln inhalation aerosol Inhale 2 Puffs every 6 hours as needed for Shortness of Breath. 8.5 g 0    benzonatate (TESSALON) 100 MG Cap Take 1 Capsule by mouth 3 times a day as needed for Cough. 60 Capsule 0    [START ON 3/8/2024] predniSONE (DELTASONE) 20 MG Tab Take 2 Tablets by mouth every day for 5 days. 10 Tablet 0    mometasone-formoterol (DULERA) 200-5 MCG/ACT Aerosol Inhale 2 Puffs 2 times a day for 60 days. 13 g 0    montelukast (SINGULAIR) 10 MG Tab Take 1 Tablet by mouth every evening for 60 days. 60 Tablet 0    albuterol (PROVENTIL) 2.5mg/3ml Nebu Soln solution for nebulization Take 3 mL by nebulization every four hours as needed for Shortness of Breath for up to 30 days. 360 mL 0      Estephanie Salazar, PharmD

## 2024-03-07 NOTE — CARE PLAN
Problem: Pain - Standard  Goal: Alleviation of pain or a reduction in pain to the patient’s comfort goal  Outcome: Progressing     Problem: Knowledge Deficit - COPD  Goal: Patient/significant other demonstrates understanding of disease process, utilization of the Action Plan, medications and discharge instruction  Outcome: Progressing     Problem: Risk for Infection - COPD  Goal: Patient will remain free from signs and symptoms of infection  Outcome: Progressing     Problem: Nutrition - Advanced  Goal: Patient will display progressive weight gain toward goal have adequate food and fluid intake  Outcome: Progressing     Problem: Ineffective Airway Clearance  Goal: Patient will maintain patent airway with clear/clearing breath sounds  Outcome: Progressing     Problem: Impaired Gas Exchange  Goal: Patient will demonstrate improved ventilation and adequate oxygenation and participate in treatment regimen within the level of ability/situation.  Outcome: Progressing     Problem: Risk for Aspiration  Goal: Patient's risk for aspiration will be absent or decrease  Outcome: Progressing     Problem: Self Care  Goal: Patient will have the ability to perform ADLs independently or with assistance (bathe, groom, dress, toilet and feed)  Outcome: Progressing     Problem: Knowledge Deficit - Standard  Goal: Patient and family/care givers will demonstrate understanding of plan of care, disease process/condition, diagnostic tests and medications  Outcome: Progressing   The patient is Stable - Low risk of patient condition declining or worsening    Shift Goals  Clinical Goals: wean 02, monitor respiratory status  Patient Goals: rest, comfort  Family Goals: RACHEL    Progress made toward(s) clinical / shift goals:  VSS, safety    Patient is not progressing towards the following goals:

## 2024-03-07 NOTE — DISCHARGE PLANNING
@ 6650: MELVIN received a Voalte message from [facility] stating pt cannot cover his medication cost and does not have insurance to cover costs. MELVIN asked [facility] leadership to cover costs for the following medication(s):     Benzonatate: $12.00   Montelukast: $12.00  Albuterol: $33.90  Dulera: $50.12  Levalbuterol: 42.55    @ 1050: Per [facility] leadership, pt's ASF for medication has been approved. Per [facility] leadership, pt's nebulizer needs can also be approved. MELVIN notified DPA to get quote via [place].    @ 5632: MELVIN asked [facility] for verbal approval pertaining to pt's nebulizer cost being $135.00. Per [facility] leadership, it has been approved. MELVIN completed ASF and handed it to [facility] to fax to [facility].    @ 3460: MELVIN called [facility] regarding ETA for nebulizer for pt. MELVIN spoke to Maynor. Per Maynor, nebulizer will be delivered in an hour. MELVIN notified RN.

## 2024-03-07 NOTE — PROGRESS NOTES
Handoff report received from day shift nurse. Pt care assumed. Pt is currently resting in bed. POC discussed with Pt and Pt verbalizes no questions at this time. Pt is AAOx4, on 2 L NC, on Tele monitoring, and VSS. Call light and belongings within reach, bed in lowest and locked position, and Pt educated on use of call light. Will continue to monitor.

## 2024-03-17 NOTE — PROGRESS NOTES
Pulmonary Clinic- Initial Consult    Date of Service: 3/19/24    Referring Physician: Nataliia Venegas D.O.    Reason for Consult: Severe Persistent Asthma    Chief Complaint:   Chief Complaint   Patient presents with    Establish Care     PULM NP / REF BY:NATALIIA VENEGAS / DX: Acute respiratory failure with hypoxia       HPI:   Sree Jack is a 33 y.o. male who is followed by Nataliia Venegas and is referred to the pulmonary clinic for Asthma. 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 He states it was called reactive airways disease at that time but he didn't have a maintenance inhaler, only albuterol.  He was recently admitted to the hospital with asthma exacerbation after his kids had influenza.  CXR with increased airway markings to the RLL but no clear infiltrate.  He reports coughing a lot, at first dry and then productive of phlegm and accompanied by epistaxis.  He had significant shortness of breath and pleuritic chest pain with chest tightness which is improving.  He was sent home with Dulera which he is still taking, albuterol which he is needing with exertion and singulair.  He reports bizarre dreams with singulair.  He denies childhood asthma but does have a family history of asthma in some cousins.  He vapes on occasion and previously smoked cigarettes (1/2 - 3ppd) until about 3 months ago ~ 20 pack history.  He has untreated acid reflux.  Denies allergies.           Past Medical History:   Diagnosis Date    Asthma     Cough        History reviewed. No pertinent surgical history.    Social History     Socioeconomic History    Marital status: Single     Spouse name: Not on file    Number of children: Not on file    Years of education: Not on file    Highest education level: Not on file   Occupational History    Not on file   Tobacco Use    Smoking status: Not on file    Smokeless tobacco: Never   Vaping Use    Vaping Use: Never used   Substance and Sexual  Activity    Alcohol use: Not Currently    Drug use: Never    Sexual activity: Not on file   Other Topics Concern    Not on file   Social History Narrative    ** Merged History Encounter **          Social Determinants of Health     Financial Resource Strain: Not on file   Food Insecurity: Not on file   Transportation Needs: Not on file   Physical Activity: Not on file   Stress: Not on file   Social Connections: Not on file   Intimate Partner Violence: Not on file   Housing Stability: Not on file          No family history on file.    Current Outpatient Medications on File Prior to Visit   Medication Sig Dispense Refill    albuterol 108 (90 Base) MCG/ACT Aero Soln inhalation aerosol Inhale 2 Puffs every 6 hours as needed for Shortness of Breath. 8.5 g 0    benzonatate (TESSALON) 100 MG Cap Take 1 Capsule by mouth 3 times a day as needed for Cough. 60 Capsule 0    mometasone-formoterol (DULERA) 200-5 MCG/ACT Aerosol Inhale 2 Puffs 2 times a day for 60 days. 13 g 0    montelukast (SINGULAIR) 10 MG Tab Take 1 Tablet by mouth every evening for 60 days. 60 Tablet 0    albuterol (PROVENTIL) 2.5mg/3ml Nebu Soln solution for nebulization Take 3 mL by nebulization every four hours as needed for Shortness of Breath for up to 30 days. 360 mL 0     No current facility-administered medications on file prior to visit.       Allergies: Patient has no known allergies.      ROS:   Review of Systems   Constitutional:  Negative for chills and fever.   HENT:  Positive for congestion.    Respiratory:  Positive for cough and shortness of breath. Negative for sputum production and wheezing.    Cardiovascular:  Negative for chest pain, palpitations and leg swelling.   Gastrointestinal:  Positive for heartburn.   Neurological:  Negative for dizziness and headaches.   Endo/Heme/Allergies:  Negative for environmental allergies.       Vitals:  /70 (BP Location: Right arm, Patient Position: Sitting, BP Cuff Size: Adult)   Pulse 81   Ht  "1.676 m (5' 6\")   Wt 55.8 kg (123 lb)   SpO2 100%     Physical Exam:  Physical Exam  Constitutional:       Appearance: Normal appearance.   HENT:      Head: Atraumatic.      Mouth/Throat:      Mouth: Mucous membranes are dry.   Cardiovascular:      Rate and Rhythm: Normal rate and regular rhythm.   Pulmonary:      Effort: Pulmonary effort is normal. No respiratory distress.      Breath sounds: Normal breath sounds. No wheezing or rales.   Skin:     General: Skin is warm.   Neurological:      General: No focal deficit present.      Mental Status: He is alert and oriented to person, place, and time.           Pertinent Studies:  Laboratory Data:    IgE 1358    6MWT:    PFTs as reviewed by me personally show:    Imaging as reviewed by me personally show:    CXR: 3/5/24:  IMPRESSION:     1.  No acute cardiac or pulmonary abnormalities are identified.  Pertinent Cardiac Studies:  Echo:      Assessment/Plan:    Problem List Items Addressed This Visit       Moderate persistent asthma with acute exacerbation     Improving after exacerbation.  Plan:  - PFTs (patient is cash, PFT if able to afford)  - Patient reports working on insurance papers to obtain health insurance  - Paper script for Advair Diskus 250/50 provided as this is generally the cheapest Rx with GoodRx coupons  - Discussed how to use [employer]  - Recommend allergy medication daily due to significant IgE elevation  - Recommend OTC GERD treatment  - Strongly recommended immediate cessation of vape products  - Follow up in 3 months           Other Visit Diagnoses       Acute respiratory failure with hypoxia (HCC)        Severe persistent asthma with acute exacerbation        Relevant Orders    PULMONARY FUNCTION TESTS -Test requested: Complete Pulmonary Function Test             Return in about 3 months (around 6/19/2024) for Available provider.     This note was generated using voice recognition software which has a chance of producing errors of grammar and " possibly content.  I have made every reasonable attempt to find and correct any obvious errors, but it should be expected that some may not be found prior to finalization of this note.    Time spent in record review prior to patient arrival, reviewing results, and in face-to-face encounter totaled 30 min, excluding any procedures if performed.      Sugar Cuellar MD RD  Pulmonary and Critical Care Medicine  [facility] hospital

## 2024-03-19 ENCOUNTER — OFFICE VISIT (OUTPATIENT)
Dept: SLEEP MEDICINE | Facility: MEDICAL CENTER | Age: 34
End: 2024-03-19
Attending: GENERAL PRACTICE

## 2024-03-19 VITALS
WEIGHT: 123 LBS | OXYGEN SATURATION: 100 % | HEART RATE: 81 BPM | HEIGHT: 66 IN | BODY MASS INDEX: 19.77 KG/M2 | DIASTOLIC BLOOD PRESSURE: 70 MMHG | SYSTOLIC BLOOD PRESSURE: 120 MMHG

## 2024-03-19 DIAGNOSIS — J45.51 SEVERE PERSISTENT ASTHMA WITH ACUTE EXACERBATION: ICD-10-CM

## 2024-03-19 DIAGNOSIS — J45.41 MODERATE PERSISTENT ASTHMA WITH ACUTE EXACERBATION: ICD-10-CM

## 2024-03-19 DIAGNOSIS — J96.01 ACUTE RESPIRATORY FAILURE WITH HYPOXIA (HCC): ICD-10-CM

## 2024-03-19 PROCEDURE — 3078F DIAST BP <80 MM HG: CPT | Performed by: INTERNAL MEDICINE

## 2024-03-19 PROCEDURE — 99203 OFFICE O/P NEW LOW 30 MIN: CPT | Performed by: INTERNAL MEDICINE

## 2024-03-19 PROCEDURE — 3074F SYST BP LT 130 MM HG: CPT | Performed by: INTERNAL MEDICINE

## 2024-03-19 PROCEDURE — 99212 OFFICE O/P EST SF 10 MIN: CPT | Performed by: INTERNAL MEDICINE

## 2024-03-19 ASSESSMENT — ENCOUNTER SYMPTOMS
HEADACHES: 0
WHEEZING: 0
FEVER: 0
CHILLS: 0
PALPITATIONS: 0
COUGH: 1
HEARTBURN: 1
SHORTNESS OF BREATH: 1
SPUTUM PRODUCTION: 0
DIZZINESS: 0

## 2024-03-19 ASSESSMENT — FIBROSIS 4 INDEX: FIB4 SCORE: 0.63

## 2024-03-19 NOTE — ASSESSMENT & PLAN NOTE
Improving after exacerbation.  Plan:  - PFTs (patient is cash, PFT if able to afford)  - Patient reports working on insurance papers to obtain health insurance  - Paper script for Advair Diskus 250/50 provided as this is generally the cheapest Rx with GoodRx coupons  - Discussed how to use [employer]  - Recommend allergy medication daily due to significant IgE elevation  - Recommend OTC GERD treatment  - Strongly recommended immediate cessation of vape products  - Follow up in 3 months

## 2024-05-16 ENCOUNTER — TELEPHONE (OUTPATIENT)
Dept: HEALTH INFORMATION MANAGEMENT | Facility: OTHER | Age: 34
End: 2024-05-16

## 2024-06-11 ENCOUNTER — TELEPHONE (OUTPATIENT)
Dept: SLEEP MEDICINE | Facility: MEDICAL CENTER | Age: 34
End: 2024-06-11

## 2024-06-11 NOTE — TELEPHONE ENCOUNTER
Called and left patient a voice mail to return call to [phone]. Attempting to inform of estimate and to obtain new insurance information if available. Please inform patient of self pay and estimate if call is returned.

## 2024-07-28 ENCOUNTER — APPOINTMENT (OUTPATIENT)
Dept: RADIOLOGY | Facility: MEDICAL CENTER | Age: 34
End: 2024-07-28
Attending: EMERGENCY MEDICINE

## 2024-07-28 ENCOUNTER — HOSPITAL ENCOUNTER (EMERGENCY)
Facility: MEDICAL CENTER | Age: 34
End: 2024-07-28

## 2024-07-28 ENCOUNTER — HOSPITAL ENCOUNTER (EMERGENCY)
Facility: MEDICAL CENTER | Age: 34
End: 2024-07-28
Attending: EMERGENCY MEDICINE

## 2024-07-28 VITALS
WEIGHT: 123 LBS | BODY MASS INDEX: 19.77 KG/M2 | OXYGEN SATURATION: 93 % | RESPIRATION RATE: 16 BRPM | SYSTOLIC BLOOD PRESSURE: 106 MMHG | HEART RATE: 99 BPM | TEMPERATURE: 98.1 F | DIASTOLIC BLOOD PRESSURE: 66 MMHG | HEIGHT: 66 IN

## 2024-07-28 DIAGNOSIS — S20.211A CONTUSION OF RIGHT CHEST WALL, INITIAL ENCOUNTER: ICD-10-CM

## 2024-07-28 DIAGNOSIS — S01.01XA LACERATION OF SCALP, INITIAL ENCOUNTER: ICD-10-CM

## 2024-07-28 DIAGNOSIS — S09.90XA CLOSED HEAD INJURY, INITIAL ENCOUNTER: ICD-10-CM

## 2024-07-28 PROCEDURE — 304999 HCHG REPAIR-SIMPLE/INTERMED LEVEL 1

## 2024-07-28 PROCEDURE — 71045 X-RAY EXAM CHEST 1 VIEW: CPT

## 2024-07-28 PROCEDURE — 700111 HCHG RX REV CODE 636 W/ 250 OVERRIDE (IP): Performed by: EMERGENCY MEDICINE

## 2024-07-28 PROCEDURE — 90715 TDAP VACCINE 7 YRS/> IM: CPT | Performed by: EMERGENCY MEDICINE

## 2024-07-28 PROCEDURE — 700105 HCHG RX REV CODE 258: Performed by: EMERGENCY MEDICINE

## 2024-07-28 PROCEDURE — 99285 EMERGENCY DEPT VISIT HI MDM: CPT

## 2024-07-28 PROCEDURE — 303353 HCHG DERMABOND SKIN ADHESIVE

## 2024-07-28 PROCEDURE — 70450 CT HEAD/BRAIN W/O DYE: CPT

## 2024-07-28 PROCEDURE — 90471 IMMUNIZATION ADMIN: CPT

## 2024-07-28 RX ORDER — SODIUM CHLORIDE, SODIUM LACTATE, POTASSIUM CHLORIDE, CALCIUM CHLORIDE 600; 310; 30; 20 MG/100ML; MG/100ML; MG/100ML; MG/100ML
1000 INJECTION, SOLUTION INTRAVENOUS ONCE
Status: COMPLETED | OUTPATIENT
Start: 2024-07-28 | End: 2024-07-28

## 2024-07-28 RX ADMIN — CLOSTRIDIUM TETANI TOXOID ANTIGEN (FORMALDEHYDE INACTIVATED), CORYNEBACTERIUM DIPHTHERIAE TOXOID ANTIGEN (FORMALDEHYDE INACTIVATED), BORDETELLA PERTUSSIS TOXOID ANTIGEN (GLUTARALDEHYDE INACTIVATED), BORDETELLA PERTUSSIS FILAMENTOUS HEMAGGLUTININ ANTIGEN (FORMALDEHYDE INACTIVATED), BORDETELLA PERTUSSIS PERTACTIN ANTIGEN, AND BORDETELLA PERTUSSIS FIMBRIAE 2/3 ANTIGEN 0.5 ML: 5; 2; 2.5; 5; 3; 5 INJECTION, SUSPENSION INTRAMUSCULAR at 09:54

## 2024-07-28 RX ADMIN — SODIUM CHLORIDE, POTASSIUM CHLORIDE, SODIUM LACTATE AND CALCIUM CHLORIDE 1000 ML: 600; 310; 30; 20 INJECTION, SOLUTION INTRAVENOUS at 09:49

## 2024-07-28 ASSESSMENT — LIFESTYLE VARIABLES: DO YOU DRINK ALCOHOL: NO

## 2024-07-28 ASSESSMENT — FIBROSIS 4 INDEX: FIB4 SCORE: 0.65

## 2024-07-28 NOTE — ED NOTES
Discharge instructions given.  All questions answered.  Pt to follow-up with PCP as needed, return to ER if symptoms worsen as discussed.  Pt verbalized understanding.  All belongings with pt.  Pt ambulated to lobby.
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
## 2025-02-20 ENCOUNTER — APPOINTMENT (OUTPATIENT)
Dept: RADIOLOGY | Facility: MEDICAL CENTER | Age: 35
DRG: 190 | End: 2025-02-20
Attending: EMERGENCY MEDICINE

## 2025-02-20 ENCOUNTER — HOSPITAL ENCOUNTER (INPATIENT)
Facility: MEDICAL CENTER | Age: 35
LOS: 1 days | DRG: 190 | End: 2025-02-21
Attending: EMERGENCY MEDICINE

## 2025-02-20 DIAGNOSIS — J45.901 ASTHMA WITH ACUTE EXACERBATION, UNSPECIFIED ASTHMA SEVERITY, UNSPECIFIED WHETHER PERSISTENT: ICD-10-CM

## 2025-02-20 DIAGNOSIS — R06.02 SHORTNESS OF BREATH: ICD-10-CM

## 2025-02-20 DIAGNOSIS — R09.02 HYPOXIA: ICD-10-CM

## 2025-02-20 PROBLEM — E87.6 HYPOKALEMIA: Status: ACTIVE | Noted: 2025-02-20

## 2025-02-20 PROBLEM — R55 SYNCOPE AND COLLAPSE: Status: ACTIVE | Noted: 2025-02-20

## 2025-02-20 PROBLEM — J44.1 ASTHMA EXACERBATION IN COPD (HCC): Status: ACTIVE | Noted: 2025-02-20

## 2025-02-20 LAB
ALBUMIN SERPL BCP-MCNC: 4.1 G/DL (ref 3.2–4.9)
ALBUMIN/GLOB SERPL: 1.6 G/DL
ALP SERPL-CCNC: 79 U/L (ref 30–99)
ALT SERPL-CCNC: 21 U/L (ref 2–50)
ANION GAP SERPL CALC-SCNC: 13 MMOL/L (ref 7–16)
AST SERPL-CCNC: 26 U/L (ref 12–45)
BASOPHILS # BLD AUTO: 0.5 % (ref 0–1.8)
BASOPHILS # BLD: 0.09 K/UL (ref 0–0.12)
BILIRUB SERPL-MCNC: 0.2 MG/DL (ref 0.1–1.5)
BUN SERPL-MCNC: 8 MG/DL (ref 8–22)
CALCIUM ALBUM COR SERPL-MCNC: 8.1 MG/DL (ref 8.5–10.5)
CALCIUM SERPL-MCNC: 8.2 MG/DL (ref 8.5–10.5)
CHLORIDE SERPL-SCNC: 106 MMOL/L (ref 96–112)
CO2 SERPL-SCNC: 21 MMOL/L (ref 20–33)
CREAT SERPL-MCNC: 0.67 MG/DL (ref 0.5–1.4)
D DIMER PPP IA.FEU-MCNC: <0.27 UG/ML (FEU) (ref 0–0.5)
EKG IMPRESSION: NORMAL
EOSINOPHIL # BLD AUTO: 0.77 K/UL (ref 0–0.51)
EOSINOPHIL NFR BLD: 4.5 % (ref 0–6.9)
ERYTHROCYTE [DISTWIDTH] IN BLOOD BY AUTOMATED COUNT: 41.9 FL (ref 35.9–50)
FLUAV RNA SPEC QL NAA+PROBE: NEGATIVE
FLUBV RNA SPEC QL NAA+PROBE: NEGATIVE
GFR SERPLBLD CREATININE-BSD FMLA CKD-EPI: 126 ML/MIN/1.73 M 2
GLOBULIN SER CALC-MCNC: 2.6 G/DL (ref 1.9–3.5)
GLUCOSE SERPL-MCNC: 113 MG/DL (ref 65–99)
HCT VFR BLD AUTO: 41.1 % (ref 42–52)
HGB BLD-MCNC: 14.1 G/DL (ref 14–18)
IMM GRANULOCYTES # BLD AUTO: 0.11 K/UL (ref 0–0.11)
IMM GRANULOCYTES NFR BLD AUTO: 0.6 % (ref 0–0.9)
LYMPHOCYTES # BLD AUTO: 1.57 K/UL (ref 1–4.8)
LYMPHOCYTES NFR BLD: 9.2 % (ref 22–41)
MCH RBC QN AUTO: 32.3 PG (ref 27–33)
MCHC RBC AUTO-ENTMCNC: 34.3 G/DL (ref 32.3–36.5)
MCV RBC AUTO: 94.1 FL (ref 81.4–97.8)
MONOCYTES # BLD AUTO: 1.48 K/UL (ref 0–0.85)
MONOCYTES NFR BLD AUTO: 8.6 % (ref 0–13.4)
NEUTROPHILS # BLD AUTO: 13.09 K/UL (ref 1.82–7.42)
NEUTROPHILS NFR BLD: 76.6 % (ref 44–72)
NRBC # BLD AUTO: 0 K/UL
NRBC BLD-RTO: 0 /100 WBC (ref 0–0.2)
NT-PROBNP SERPL IA-MCNC: 67 PG/ML (ref 0–125)
PLATELET # BLD AUTO: 318 K/UL (ref 164–446)
PMV BLD AUTO: 9.6 FL (ref 9–12.9)
POTASSIUM SERPL-SCNC: 3.4 MMOL/L (ref 3.6–5.5)
PROT SERPL-MCNC: 6.7 G/DL (ref 6–8.2)
RBC # BLD AUTO: 4.37 M/UL (ref 4.7–6.1)
RSV RNA SPEC QL NAA+PROBE: NEGATIVE
SARS-COV-2 RNA RESP QL NAA+PROBE: NOTDETECTED
SODIUM SERPL-SCNC: 140 MMOL/L (ref 135–145)
TROPONIN T SERPL-MCNC: <6 NG/L (ref 6–19)
WBC # BLD AUTO: 17.1 K/UL (ref 4.8–10.8)

## 2025-02-20 PROCEDURE — 770006 HCHG ROOM/CARE - MED/SURG/GYN SEMI*

## 2025-02-20 PROCEDURE — 99285 EMERGENCY DEPT VISIT HI MDM: CPT

## 2025-02-20 PROCEDURE — 0202U NFCT DS 22 TRGT SARS-COV-2: CPT

## 2025-02-20 PROCEDURE — 71046 X-RAY EXAM CHEST 2 VIEWS: CPT

## 2025-02-20 PROCEDURE — 85379 FIBRIN DEGRADATION QUANT: CPT

## 2025-02-20 PROCEDURE — 83880 ASSAY OF NATRIURETIC PEPTIDE: CPT

## 2025-02-20 PROCEDURE — 93005 ELECTROCARDIOGRAM TRACING: CPT | Mod: TC | Performed by: EMERGENCY MEDICINE

## 2025-02-20 PROCEDURE — 700102 HCHG RX REV CODE 250 W/ 637 OVERRIDE(OP): Performed by: EMERGENCY MEDICINE

## 2025-02-20 PROCEDURE — 36415 COLL VENOUS BLD VENIPUNCTURE: CPT

## 2025-02-20 PROCEDURE — 700101 HCHG RX REV CODE 250: Performed by: EMERGENCY MEDICINE

## 2025-02-20 PROCEDURE — A9270 NON-COVERED ITEM OR SERVICE: HCPCS

## 2025-02-20 PROCEDURE — 80053 COMPREHEN METABOLIC PANEL: CPT

## 2025-02-20 PROCEDURE — 0241U HCHG SARS-COV-2 COVID-19 NFCT DS RESP RNA 4 TRGT ED POC: CPT

## 2025-02-20 PROCEDURE — 94640 AIRWAY INHALATION TREATMENT: CPT

## 2025-02-20 PROCEDURE — 700102 HCHG RX REV CODE 250 W/ 637 OVERRIDE(OP)

## 2025-02-20 PROCEDURE — 85025 COMPLETE CBC W/AUTO DIFF WBC: CPT

## 2025-02-20 PROCEDURE — 84484 ASSAY OF TROPONIN QUANT: CPT

## 2025-02-20 PROCEDURE — A9270 NON-COVERED ITEM OR SERVICE: HCPCS | Performed by: EMERGENCY MEDICINE

## 2025-02-20 PROCEDURE — 93005 ELECTROCARDIOGRAM TRACING: CPT | Mod: TC

## 2025-02-20 PROCEDURE — 700111 HCHG RX REV CODE 636 W/ 250 OVERRIDE (IP): Mod: JZ

## 2025-02-20 PROCEDURE — 99223 1ST HOSP IP/OBS HIGH 75: CPT | Mod: GC

## 2025-02-20 RX ORDER — ALBUTEROL SULFATE 5 MG/ML
2.5 SOLUTION RESPIRATORY (INHALATION) EVERY 4 HOURS PRN
Status: DISCONTINUED | OUTPATIENT
Start: 2025-02-20 | End: 2025-02-21 | Stop reason: HOSPADM

## 2025-02-20 RX ORDER — POLYETHYLENE GLYCOL 3350 17 G/17G
1 POWDER, FOR SOLUTION ORAL
Status: DISCONTINUED | OUTPATIENT
Start: 2025-02-20 | End: 2025-02-21 | Stop reason: HOSPADM

## 2025-02-20 RX ORDER — IPRATROPIUM BROMIDE AND ALBUTEROL SULFATE 2.5; .5 MG/3ML; MG/3ML
3 SOLUTION RESPIRATORY (INHALATION)
Status: COMPLETED | OUTPATIENT
Start: 2025-02-20 | End: 2025-02-20

## 2025-02-20 RX ORDER — EPINEPHRINE INHALATION 125 UG/1
2 AEROSOL RESPIRATORY (INHALATION) EVERY 6 HOURS PRN
COMMUNITY

## 2025-02-20 RX ORDER — POTASSIUM CHLORIDE 1500 MG/1
20 TABLET, EXTENDED RELEASE ORAL ONCE
Status: COMPLETED | OUTPATIENT
Start: 2025-02-20 | End: 2025-02-20

## 2025-02-20 RX ORDER — ACETAMINOPHEN 325 MG/1
650 TABLET ORAL EVERY 6 HOURS PRN
Status: DISCONTINUED | OUTPATIENT
Start: 2025-02-20 | End: 2025-02-21 | Stop reason: HOSPADM

## 2025-02-20 RX ORDER — ALBUTEROL SULFATE 0.83 MG/ML
2.5 SOLUTION RESPIRATORY (INHALATION) EVERY 4 HOURS PRN
COMMUNITY

## 2025-02-20 RX ORDER — ONDANSETRON 4 MG/1
4 TABLET, ORALLY DISINTEGRATING ORAL EVERY 4 HOURS PRN
Status: DISCONTINUED | OUTPATIENT
Start: 2025-02-20 | End: 2025-02-21 | Stop reason: HOSPADM

## 2025-02-20 RX ORDER — PROCHLORPERAZINE EDISYLATE 5 MG/ML
5-10 INJECTION INTRAMUSCULAR; INTRAVENOUS EVERY 4 HOURS PRN
Status: DISCONTINUED | OUTPATIENT
Start: 2025-02-20 | End: 2025-02-21 | Stop reason: HOSPADM

## 2025-02-20 RX ORDER — ENOXAPARIN SODIUM 100 MG/ML
40 INJECTION SUBCUTANEOUS DAILY
Status: DISCONTINUED | OUTPATIENT
Start: 2025-02-20 | End: 2025-02-21 | Stop reason: HOSPADM

## 2025-02-20 RX ORDER — PROMETHAZINE HYDROCHLORIDE 25 MG/1
12.5-25 TABLET ORAL EVERY 4 HOURS PRN
Status: DISCONTINUED | OUTPATIENT
Start: 2025-02-20 | End: 2025-02-21 | Stop reason: HOSPADM

## 2025-02-20 RX ORDER — PROMETHAZINE HYDROCHLORIDE 25 MG/1
12.5-25 SUPPOSITORY RECTAL EVERY 4 HOURS PRN
Status: DISCONTINUED | OUTPATIENT
Start: 2025-02-20 | End: 2025-02-21 | Stop reason: HOSPADM

## 2025-02-20 RX ORDER — ONDANSETRON 2 MG/ML
4 INJECTION INTRAMUSCULAR; INTRAVENOUS EVERY 4 HOURS PRN
Status: DISCONTINUED | OUTPATIENT
Start: 2025-02-20 | End: 2025-02-21 | Stop reason: HOSPADM

## 2025-02-20 RX ORDER — GUAIFENESIN/DEXTROMETHORPHAN 100-10MG/5
10 SYRUP ORAL EVERY 6 HOURS PRN
Status: DISCONTINUED | OUTPATIENT
Start: 2025-02-20 | End: 2025-02-21 | Stop reason: HOSPADM

## 2025-02-20 RX ORDER — ALBUTEROL SULFATE 90 UG/1
2 INHALANT RESPIRATORY (INHALATION) EVERY 6 HOURS PRN
Status: DISCONTINUED | OUTPATIENT
Start: 2025-02-20 | End: 2025-02-20

## 2025-02-20 RX ORDER — AMOXICILLIN 250 MG
2 CAPSULE ORAL EVERY EVENING
Status: DISCONTINUED | OUTPATIENT
Start: 2025-02-20 | End: 2025-02-21 | Stop reason: HOSPADM

## 2025-02-20 RX ORDER — PREDNISONE 20 MG/1
40 TABLET ORAL DAILY
Status: DISCONTINUED | OUTPATIENT
Start: 2025-02-21 | End: 2025-02-21 | Stop reason: HOSPADM

## 2025-02-20 RX ADMIN — POTASSIUM CHLORIDE 20 MEQ: 1500 TABLET, EXTENDED RELEASE ORAL at 20:50

## 2025-02-20 RX ADMIN — ENOXAPARIN SODIUM 40 MG: 100 INJECTION SUBCUTANEOUS at 22:44

## 2025-02-20 RX ADMIN — IPRATROPIUM BROMIDE AND ALBUTEROL SULFATE 3 ML: .5; 2.5 SOLUTION RESPIRATORY (INHALATION) at 20:53

## 2025-02-20 RX ADMIN — MOMETASONE FUROATE AND FORMOTEROL FUMARATE DIHYDRATE 2 PUFF: 200; 5 AEROSOL RESPIRATORY (INHALATION) at 23:43

## 2025-02-20 RX ADMIN — GUAIFENESIN SYRUP AND DEXTROMETHORPHAN 10 ML: 100; 10 SYRUP ORAL at 22:44

## 2025-02-20 SDOH — ECONOMIC STABILITY: TRANSPORTATION INSECURITY
IN THE PAST 12 MONTHS, HAS THE LACK OF TRANSPORTATION KEPT YOU FROM MEDICAL APPOINTMENTS OR FROM GETTING MEDICATIONS?: NO

## 2025-02-20 SDOH — ECONOMIC STABILITY: TRANSPORTATION INSECURITY
IN THE PAST 12 MONTHS, HAS LACK OF RELIABLE TRANSPORTATION KEPT YOU FROM MEDICAL APPOINTMENTS, MEETINGS, WORK OR FROM GETTING THINGS NEEDED FOR DAILY LIVING?: NO

## 2025-02-20 ASSESSMENT — ENCOUNTER SYMPTOMS
CONSTITUTIONAL NEGATIVE: 1
CLAUDICATION: 0
SPUTUM PRODUCTION: 0
GASTROINTESTINAL NEGATIVE: 1
CHILLS: 0
COUGH: 1
WHEEZING: 1
ORTHOPNEA: 0
PSYCHIATRIC NEGATIVE: 1
WEAKNESS: 0
WEIGHT LOSS: 0
MUSCULOSKELETAL NEGATIVE: 1
DIZZINESS: 1
FEVER: 0
EYES NEGATIVE: 1
SHORTNESS OF BREATH: 1
HEADACHES: 0

## 2025-02-20 ASSESSMENT — LIFESTYLE VARIABLES
AVERAGE NUMBER OF DAYS PER WEEK YOU HAVE A DRINK CONTAINING ALCOHOL: 0
TOTAL SCORE: 0
HAVE PEOPLE ANNOYED YOU BY CRITICIZING YOUR DRINKING: NO
HOW MANY TIMES IN THE PAST YEAR HAVE YOU HAD 5 OR MORE DRINKS IN A DAY: 0
CONSUMPTION TOTAL: NEGATIVE
HAVE YOU EVER FELT YOU SHOULD CUT DOWN ON YOUR DRINKING: NO
EVER HAD A DRINK FIRST THING IN THE MORNING TO STEADY YOUR NERVES TO GET RID OF A HANGOVER: NO
ON A TYPICAL DAY WHEN YOU DRINK ALCOHOL HOW MANY DRINKS DO YOU HAVE: 0
TOTAL SCORE: 0
ALCOHOL_USE: NO
TOTAL SCORE: 0
EVER FELT BAD OR GUILTY ABOUT YOUR DRINKING: NO

## 2025-02-20 ASSESSMENT — PATIENT HEALTH QUESTIONNAIRE - PHQ9
4. FEELING TIRED OR HAVING LITTLE ENERGY: MORE THAN HALF THE DAYS
2. FEELING DOWN, DEPRESSED, IRRITABLE, OR HOPELESS: NOT AT ALL
9. THOUGHTS THAT YOU WOULD BE BETTER OFF DEAD, OR OF HURTING YOURSELF: NOT AT ALL
7. TROUBLE CONCENTRATING ON THINGS, SUCH AS READING THE NEWSPAPER OR WATCHING TELEVISION: NOT AT ALL
8. MOVING OR SPEAKING SO SLOWLY THAT OTHER PEOPLE COULD HAVE NOTICED. OR THE OPPOSITE, BEING SO FIGETY OR RESTLESS THAT YOU HAVE BEEN MOVING AROUND A LOT MORE THAN USUAL: NOT AT ALL
SUM OF ALL RESPONSES TO PHQ9 QUESTIONS 1 AND 2: 2
3. TROUBLE FALLING OR STAYING ASLEEP OR SLEEPING TOO MUCH: NOT AT ALL
SUM OF ALL RESPONSES TO PHQ QUESTIONS 1-9: 7
1. LITTLE INTEREST OR PLEASURE IN DOING THINGS: MORE THAN HALF THE DAYS
6. FEELING BAD ABOUT YOURSELF - OR THAT YOU ARE A FAILURE OR HAVE LET YOURSELF OR YOUR FAMILY DOWN: NOT AL ALL
5. POOR APPETITE OR OVEREATING: NEARLY EVERY DAY

## 2025-02-20 ASSESSMENT — COGNITIVE AND FUNCTIONAL STATUS - GENERAL
SUGGESTED CMS G CODE MODIFIER MOBILITY: CH
DAILY ACTIVITIY SCORE: 24
MOBILITY SCORE: 24
SUGGESTED CMS G CODE MODIFIER DAILY ACTIVITY: CH

## 2025-02-20 ASSESSMENT — SOCIAL DETERMINANTS OF HEALTH (SDOH)

## 2025-02-20 ASSESSMENT — PAIN DESCRIPTION - PAIN TYPE: TYPE: ACUTE PAIN

## 2025-02-20 ASSESSMENT — FIBROSIS 4 INDEX: FIB4 SCORE: 0.65

## 2025-02-21 ENCOUNTER — PHARMACY VISIT (OUTPATIENT)
Dept: PHARMACY | Facility: MEDICAL CENTER | Age: 35
End: 2025-02-21
Payer: COMMERCIAL

## 2025-02-21 VITALS
TEMPERATURE: 99.2 F | RESPIRATION RATE: 16 BRPM | HEART RATE: 84 BPM | DIASTOLIC BLOOD PRESSURE: 77 MMHG | HEIGHT: 66 IN | BODY MASS INDEX: 20.09 KG/M2 | OXYGEN SATURATION: 93 % | WEIGHT: 125 LBS | SYSTOLIC BLOOD PRESSURE: 125 MMHG

## 2025-02-21 LAB
ALBUMIN SERPL BCP-MCNC: 4.4 G/DL (ref 3.2–4.9)
ALBUMIN/GLOB SERPL: 1.5 G/DL
ALP SERPL-CCNC: 83 U/L (ref 30–99)
ALT SERPL-CCNC: 21 U/L (ref 2–50)
ANION GAP SERPL CALC-SCNC: 19 MMOL/L (ref 7–16)
AST SERPL-CCNC: 27 U/L (ref 12–45)
B PARAP IS1001 DNA NPH QL NAA+NON-PROBE: NOT DETECTED
B PERT.PT PRMT NPH QL NAA+NON-PROBE: NOT DETECTED
BILIRUB SERPL-MCNC: 0.2 MG/DL (ref 0.1–1.5)
BUN SERPL-MCNC: 9 MG/DL (ref 8–22)
C PNEUM DNA NPH QL NAA+NON-PROBE: NOT DETECTED
CALCIUM ALBUM COR SERPL-MCNC: 8.8 MG/DL (ref 8.5–10.5)
CALCIUM SERPL-MCNC: 9.1 MG/DL (ref 8.5–10.5)
CHLORIDE SERPL-SCNC: 103 MMOL/L (ref 96–112)
CO2 SERPL-SCNC: 18 MMOL/L (ref 20–33)
CREAT SERPL-MCNC: 0.82 MG/DL (ref 0.5–1.4)
ERYTHROCYTE [DISTWIDTH] IN BLOOD BY AUTOMATED COUNT: 42.1 FL (ref 35.9–50)
FLUAV RNA NPH QL NAA+NON-PROBE: NOT DETECTED
FLUBV RNA NPH QL NAA+NON-PROBE: NOT DETECTED
GFR SERPLBLD CREATININE-BSD FMLA CKD-EPI: 118 ML/MIN/1.73 M 2
GLOBULIN SER CALC-MCNC: 3 G/DL (ref 1.9–3.5)
GLUCOSE SERPL-MCNC: 168 MG/DL (ref 65–99)
HADV DNA NPH QL NAA+NON-PROBE: NOT DETECTED
HCOV 229E RNA NPH QL NAA+NON-PROBE: NOT DETECTED
HCOV HKU1 RNA NPH QL NAA+NON-PROBE: NOT DETECTED
HCOV NL63 RNA NPH QL NAA+NON-PROBE: NOT DETECTED
HCOV OC43 RNA NPH QL NAA+NON-PROBE: NOT DETECTED
HCT VFR BLD AUTO: 42.2 % (ref 42–52)
HGB BLD-MCNC: 14.4 G/DL (ref 14–18)
HMPV RNA NPH QL NAA+NON-PROBE: NOT DETECTED
HPIV1 RNA NPH QL NAA+NON-PROBE: NOT DETECTED
HPIV2 RNA NPH QL NAA+NON-PROBE: NOT DETECTED
HPIV3 RNA NPH QL NAA+NON-PROBE: NOT DETECTED
HPIV4 RNA NPH QL NAA+NON-PROBE: NOT DETECTED
M PNEUMO DNA NPH QL NAA+NON-PROBE: NOT DETECTED
MCH RBC QN AUTO: 32 PG (ref 27–33)
MCHC RBC AUTO-ENTMCNC: 34.1 G/DL (ref 32.3–36.5)
MCV RBC AUTO: 93.8 FL (ref 81.4–97.8)
PLATELET # BLD AUTO: 347 K/UL (ref 164–446)
PMV BLD AUTO: 9.9 FL (ref 9–12.9)
POTASSIUM SERPL-SCNC: 4.2 MMOL/L (ref 3.6–5.5)
PROCALCITONIN SERPL-MCNC: 0.23 NG/ML
PROT SERPL-MCNC: 7.4 G/DL (ref 6–8.2)
RBC # BLD AUTO: 4.5 M/UL (ref 4.7–6.1)
RSV RNA NPH QL NAA+NON-PROBE: NOT DETECTED
RV+EV RNA NPH QL NAA+NON-PROBE: DETECTED
SARS-COV-2 RNA NPH QL NAA+NON-PROBE: NOTDETECTED
SODIUM SERPL-SCNC: 140 MMOL/L (ref 135–145)
WBC # BLD AUTO: 13.7 K/UL (ref 4.8–10.8)

## 2025-02-21 PROCEDURE — 99239 HOSP IP/OBS DSCHRG MGMT >30: CPT | Performed by: STUDENT IN AN ORGANIZED HEALTH CARE EDUCATION/TRAINING PROGRAM

## 2025-02-21 PROCEDURE — 94640 AIRWAY INHALATION TREATMENT: CPT

## 2025-02-21 PROCEDURE — 36415 COLL VENOUS BLD VENIPUNCTURE: CPT

## 2025-02-21 PROCEDURE — RXMED WILLOW AMBULATORY MEDICATION CHARGE: Performed by: STUDENT IN AN ORGANIZED HEALTH CARE EDUCATION/TRAINING PROGRAM

## 2025-02-21 PROCEDURE — 700111 HCHG RX REV CODE 636 W/ 250 OVERRIDE (IP)

## 2025-02-21 PROCEDURE — 84145 PROCALCITONIN (PCT): CPT

## 2025-02-21 PROCEDURE — 85027 COMPLETE CBC AUTOMATED: CPT

## 2025-02-21 PROCEDURE — 80053 COMPREHEN METABOLIC PANEL: CPT

## 2025-02-21 PROCEDURE — 700101 HCHG RX REV CODE 250

## 2025-02-21 RX ORDER — ALBUTEROL SULFATE 90 UG/1
2 INHALANT RESPIRATORY (INHALATION) EVERY 4 HOURS PRN
Status: DISCONTINUED | OUTPATIENT
Start: 2025-02-21 | End: 2025-02-21 | Stop reason: HOSPADM

## 2025-02-21 RX ORDER — PREDNISONE 20 MG/1
40 TABLET ORAL DAILY
Qty: 8 TABLET | Refills: 0 | Status: SHIPPED | OUTPATIENT
Start: 2025-02-21 | End: 2025-02-25

## 2025-02-21 RX ADMIN — MOMETASONE FUROATE AND FORMOTEROL FUMARATE DIHYDRATE 2 PUFF: 200; 5 AEROSOL RESPIRATORY (INHALATION) at 08:45

## 2025-02-21 RX ADMIN — PREDNISONE 40 MG: 20 TABLET ORAL at 06:05

## 2025-02-21 RX ADMIN — ALBUTEROL SULFATE 2.5 MG: 2.5 SOLUTION RESPIRATORY (INHALATION) at 11:07

## 2025-02-21 ASSESSMENT — PAIN DESCRIPTION - PAIN TYPE
TYPE: ACUTE PAIN
TYPE: ACUTE PAIN
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
## 2025-02-21 NOTE — ASSESSMENT & PLAN NOTE
This is secondary to asthma exacerbation.  This exacerbation is likely triggered by viral upper respiratory infection exposure from his roommates kids and poor medication adherence.  -Oxygen titration greater than 90%.  -Pro-Daniel.  -Guaifenesin for cough.   -Continue DuoNebs every 4 as needed.  -Prednisone 40 Mg p.o. daily for 5 to 7 days followed by a taper dose.   -Advair 250/50 was recommended in his last pulm visit.  ~  Started him with  DULERA now.   -RT protocol.  -Stop over-the-counter Primatene mist.  -Outpatient pulmonology f/u.

## 2025-02-21 NOTE — PROGRESS NOTES
Assumed care of patient at after receiving report from Zina. Pt is A&Ox 4. Pain level zero. Assessment complete. Call light within reach and bed is locked and in lowest position. Reinforced the need to call for assistance. Plan of care discussed. Patient requesting neb treatment.

## 2025-02-21 NOTE — ED TRIAGE NOTES
Chief Complaint   Patient presents with    Shortness of Breath     SOB x 3 days. Hx asthma. States ran out of inhalers and nebulizer solution.     Cold Symptoms     Reports nasal congestion and intermittent cough for a few days. States his roommates kids have been sick     Pt BIB EMS for above. Per EMS pt was 86% on RA.     Pt given 2 duo nebs, 1 albuterol, 2g of mag PTA.     Pt placed on monitoring. Pt on 2L NC. Viral swab collected and sent. Chart up for ERP

## 2025-02-21 NOTE — ASSESSMENT & PLAN NOTE
Although clear cause of the syncope is not identified at this point this is likely from hypoxia due to asthma exacerbation.  No neurologic deficits or postconcussive symptoms associated.  -Continue to monitor clinically.  -Orthostatic vitals.
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
## 2025-02-21 NOTE — PROGRESS NOTES
I discussed the proposed treatment of oxygen with the patient, Sree Jack, including its potential benefits, risks, and alternatives. The patient was also informed of the possible consequences of refusing the treatment, including death.  Despite this detailed discussion, the patient has expressed their decision to decline the proposed treatment. They were given the opportunity to ask questions. The patient demonstrated understanding of the information provided.  The patient has been encouraged to return or seek medical attention if their condition worsens or if they reconsider their decision.  Medications given to patient. IV removed. All belongings with patient.    Angeles Rios R.N.

## 2025-02-21 NOTE — H&P
"[facility] History & Physical Note    Date of Service  2/20/2025    [facility] Team: JUAN CARLOS   Attending: Uriel Costa D.o.  Senior Resident: Dr. Cazares  Intern:  Dr. Lyons  Contact Number: [phone]    Primary Care Physician  Pcp Pt States None        Code Status  Full Code    Chief Complaint  Chief Complaint   Patient presents with    Shortness of Breath     SOB x 3 days. Hx asthma. States ran out of inhalers and nebulizer solution.     Cold Symptoms     Reports nasal congestion and intermittent cough for a few days. States his roommates kids have been sick       History of Presenting Illness (HPI):   Sree Jack is a 34 y.o. male with past medical history of multiple admissions for asthma exacerbations who presented 2/20/2025 with shortness of breath from 3 days.  States that he ran out of inhalers and nebulizer solutions.  He also reports nasal congestion and intermittent cough since a few days.  States that his roommates kids have been sick.  Patient reports shortness of breath since 3 days with nonproductive cough.  Also reports nasal congestion.  Chest pain on taking deep breaths.  Patient describes that the whole experience is like \"a fat kid sitting on his chest and choking him.\"  Patient also reports that he passed out 3 times today at 12 PM, 4 PM and 6 PM.  Tells me all the incidences were preceded by dizziness.  Does not report chest pain/palpitations.  Reports that the loss of consciousness was associated with falls approximately for 2 to 3 minutes.  He tells me in the last fall he hit his head on right temporoparietal part of his skull.  Does not report headache or any neurologic deficit.    In the ED:  -As per EMS patient was saturating 86% on room air.  Patient was given 2 DuoNebs 1 albuterol and 2 g of magnesium PTA and Solu-Medrol.  Patient was placed on 2 L nasal cannula.  Or viral swab collected and sent.  -EKG sinus tachycardia.  -CBC: White count 17.1.  Neutrophils " 76%.  -CMP sodium 140, potassium 3.4.  Liver enzymes and kidney function test normal.  -SARS-CoV-2, RSV, influenza A and B-.  Negative.  -Chest x-ray does not show any acute cardiopulmonary abnormalities.  -Patient got potassium 20 mEq, DuoNeb nebulizer 3 mL.  Patient feels much better after receiving these medications.    Home meds:  -Albuterol-Proventil nebulization.  -Albuterol 108 mcg inhalational aerosol.    As per pharmacy's note  *Patient states that he has been using an OTC Primatene Mist inhaler since he ran out of his albuterol inhaler. Patient is unsure how long it has been since he ran out of his albuterol inhaler, and is unsure of the last time he used the Primatene Mist. Patient states he also has albuterol nebulizer solution at home.     *Patient reports using an OTC cold medication today, however he cannot recall the name.    I discussed the plan of care with patient.    Review of Systems  Review of Systems   Constitutional: Negative.  Negative for chills, fever and weight loss.   HENT: Negative.     Eyes: Negative.    Respiratory:  Positive for cough, shortness of breath and wheezing. Negative for sputum production.    Cardiovascular:  Positive for chest pain. Negative for orthopnea, claudication and leg swelling.   Gastrointestinal: Negative.    Genitourinary: Negative.    Musculoskeletal: Negative.    Skin: Negative.    Neurological:  Positive for dizziness. Negative for weakness and headaches.   Endo/Heme/Allergies: Negative.    Psychiatric/Behavioral: Negative.         Past Medical History   has a past medical history of Asthma and Cough.    Surgical History   has no past surgical history on file.     Family History  family history is not on file.   Family history reviewed with patient.     Social History  Tobacco: Smokes vape  Alcohol: Does not drink alcohol  Recreational drugs (illegal or prescription): Does not use drugs.      Allergies  No Known Allergies    Medications  Prior to Admission  Medications   Prescriptions Last Dose Informant Patient Reported? Taking?   EPINEPHrine (PRIMATENE MIST) 0.125 MG/ACT Aerosol Unknown Patient Yes No   Sig: Inhale 2 Puffs every 6 hours as needed (Shortness of Breath).   NON SPECIFIED 2/20/2025 Noon Patient Yes Yes   Sig: Take 2 Capsules by mouth every 6 hours as needed (Cough, Mild Pain or Fever). Unspecified over-the-counter cold medication.   albuterol (PROVENTIL) 2.5mg/3ml Nebu Soln solution for nebulization 2/18/2025 Patient Yes Yes   Sig: Take 2.5 mg by nebulization every four hours as needed for Shortness of Breath.   albuterol 108 (90 Base) MCG/ACT Aero Soln inhalation aerosol Unknown Patient No No   Sig: Inhale 2 Puffs every 6 hours as needed for Shortness of Breath.      Facility-Administered Medications: None       Physical Exam  Temp:  [36.8 °C (98.2 °F)-37.2 °C (99 °F)] 36.8 °C (98.2 °F)  Pulse:  [102-125] 102  Resp:  [18-20] 20  BP: (115-120)/(65-74) 119/74  SpO2:  [85 %-95 %] 93 %  Blood Pressure: 115/65   Temperature: 37.2 °C (99 °F)   Pulse: (!) 113   Respiration: 20   Pulse Oximetry: 92 %       Physical Exam  Vitals reviewed.   Constitutional:       Appearance: Normal appearance. He is normal weight.   HENT:      Head: Normocephalic and atraumatic.      Nose: Nose normal.      Mouth/Throat:      Mouth: Mucous membranes are moist.   Eyes:      Extraocular Movements: Extraocular movements intact.      Conjunctiva/sclera: Conjunctivae normal.      Pupils: Pupils are equal, round, and reactive to light.   Cardiovascular:      Rate and Rhythm: Regular rhythm. Tachycardia present.      Pulses: Normal pulses.      Heart sounds: Normal heart sounds.   Pulmonary:      Effort: Pulmonary effort is normal. No respiratory distress.      Breath sounds: Wheezing present.   Chest:      Chest wall: No tenderness.   Abdominal:      General: Abdomen is flat.   Musculoskeletal:         General: Normal range of motion.      Cervical back: No rigidity.   Skin:      General: Skin is warm.      Capillary Refill: Capillary refill takes less than 2 seconds.   Neurological:      General: No focal deficit present.      Mental Status: He is alert and oriented to person, place, and time. Mental status is at baseline.   Psychiatric:         Mood and Affect: Mood normal.         Behavior: Behavior normal.         Laboratory:  Recent Labs     02/20/25 1900   WBC 17.1*   RBC 4.37*   HEMOGLOBIN 14.1   HEMATOCRIT 41.1*   MCV 94.1   MCH 32.3   MCHC 34.3   RDW 41.9   PLATELETCT 318   MPV 9.6     Recent Labs     02/20/25 1900   SODIUM 140   POTASSIUM 3.4*   CHLORIDE 106   CO2 21   GLUCOSE 113*   BUN 8   CREATININE 0.67   CALCIUM 8.2*     Recent Labs     02/20/25 1900   ALTSGPT 21   ASTSGOT 26   ALKPHOSPHAT 79   TBILIRUBIN 0.2   GLUCOSE 113*         Recent Labs     02/20/25 1900   NTPROBNP 67         Recent Labs     02/20/25 1900   TROPONINT <6       Imaging:  DX-[id number] VIEWS   Final Result      No acute cardiopulmonary abnormality.          no X-Ray or EKG requiring interpretation    Assessment/Plan:  Problem Representation: I anticipate this patient will require at least two midnights for appropriate medical management, necessitating inpatient admission because of her asthma exacerbation.    * Acute hypoxic respiratory failure (HCC)- (present on admission)  Assessment & Plan  This is secondary to asthma exacerbation.  This exacerbation is likely triggered by viral upper respiratory infection exposure from his roommates kids and poor medication adherence.  -Oxygen titration greater than 90%.  -Pro-Daniel.  -Guaifenesin for cough.   -Continue DuoNebs every 4 as needed.  -Prednisone 40 Mg p.o. daily for 5 to 7 days followed by a taper dose.   -Advair 250/50 was recommended in his last pulm visit.  ~  Started him with  DULERA now.   -RT protocol.  -Stop over-the-counter Primatene mist.  -Outpatient pulmonology f/u.    Syncope and collapse  Assessment & Plan  Although clear cause of the syncope  is not identified at this point this is likely from hypoxia due to asthma exacerbation.  No neurologic deficits or postconcussive symptoms associated.  -Continue to monitor clinically.  -Orthostatic vitals.      Asthma exacerbation in COPD (HCC)- (present on admission)  Assessment & Plan  Please see acute hypoxic resp failure.     Hypokalemia  Assessment & Plan  Replete as needed.        VTE prophylaxis: SCDs/TEDs and enoxaparin ppx

## 2025-02-21 NOTE — DISCHARGE PLANNING
CM requested a Medicaid Screen.  CM attempted to call patient on cell phone, he is in isolation and not referred to CM, however his listed phone is not a working number.    He does not have a PCP.

## 2025-02-21 NOTE — ED NOTES
Med rec completed per patient at bedside.    Allergies reviewed.    Outpatient antibiotics within the last 30 days: NONE.    ANTICOAGULANTS: NONE.    Preferred pharmacy for discharge medications: [facility] on [facility].    *Patient states that he has been using an OTC Primatene Mist inhaler since he ran out of his albuterol inhaler. Patient is unsure how long it has been since he ran out of his albuterol inhaler, and is unsure of the last time he used the Primatene Mist. Patient states he also has albuterol nebulizer solution at home.    *Patient reports using an OTC cold medication today, however he cannot recall the name.

## 2025-02-21 NOTE — PROGRESS NOTES
"4 Eyes Skin Assessment Completed by RAINA Izaguirre and RAINA Titus.    Head WDL  Ears WDL  Nose WDL  Mouth WDL  Neck WDL  Breast/Chest WDL  Shoulder Blades WDL  Spine WDL  (R) Arm/Elbow/Hand WDL  (L) Arm/Elbow/Hand WDL  Abdomen WDL  Groin WDL  Scrotum/Coccyx/Buttocks WDL  (R) Leg WDL  (L) Leg WDL  (R) Heel/Foot/Toe WDL  (L) Heel/Foot/Toe Redness to inner foot from \"dog stepping on foot\", nothing open          Devices In Places Pulse Ox      Interventions In Place NC W/Ear Foams, Pillows, and Pressure Redistribution Mattress    Possible Skin Injury No    Pictures Uploaded Into Epic N/A  Wound Consult Placed N/A  RN Wound Prevention Protocol Ordered No     "

## 2025-02-21 NOTE — PROGRESS NOTES
Assumed care of patient at 2120. Received report from ED RN Kathleen. Patient A&Ox4, on 4L NC, connected to , Reporting a pain level of 0/10. Call light within reach, belongings within reach, fall precautions in place, bed in lowest position. Patient does not have any other needs at this time.     POC was discussed with patient. All questions were answered. Patient verbalized understanding.

## 2025-02-21 NOTE — DISCHARGE SUMMARY
Discharge Summary/AMA    CHIEF COMPLAINT ON ADMISSION  Chief Complaint   Patient presents with    Shortness of Breath     SOB x 3 days. Hx asthma. States ran out of inhalers and nebulizer solution.     Cold Symptoms     Reports nasal congestion and intermittent cough for a few days. States his roommates kids have been sick       Reason for Admission  ems     Admission Date  2/20/2025    CODE STATUS  Full Code    HPI & HOSPITAL COURSE  For full details please refer to HPI    Sree Higinio Jack is a 34 y.o. male with past medical history of multiple admissions for asthma exacerbations who presented 2/20/2025 with shortness of breath from 3 days.  Found to have rhinovirus and asthma exacerbation.  Patient was started on RT protocol.  He was also started on inhaler, bronchodilator, and steroids.  Patient is currently on 2 L of oxygen.  Tachycardic.  Still with diffuse wheezing both lungs.  Patient is adamant about leaving the hospital today AGAINST MEDICAL ADVICE.  Patient is alert and orient x 3.  He has a full mental capacity. He fully understand the risk of leaving the hospital.   He stated that he does not want to wait for oxygen delivery.  Despite our effort, unfortunately patient left the hospital AGAINST MEDICAL ADVICE.  He was advised to return to the ED in anytime.  I sent inhaler and steroids to the patient pharmacy and advised to pick it up from the pharmacy.  Discussed that he could have worsening respiratory failure, hypoxia, worsening of rhinovirus infection and asthma exacerbation with potential concern for anoxia from hypoxia and including death.  Patient stated that he does not want to wait for oxygen and he would like to leave AGAINST MEDICAL ADVICE.  Unfortunately patient left the hospital AGAINST MEDICAL ADVICE.      Discharge Date  2/21/2025    FOLLOW UP ITEMS POST DISCHARGE  PCP    DISCHARGE DIAGNOSES  Principal Problem:    Acute hypoxic respiratory failure (HCC) (POA: Yes)  Active Problems:     Asthma exacerbation in COPD (HCC) (POA: Yes)    Hypokalemia (POA: Unknown)    Syncope and collapse (POA: Unknown)  Resolved Problems:    * No resolved hospital problems. *      FOLLOW UP  No future appointments.  No follow-up provider specified.    MEDICATIONS ON DISCHARGE     Medication List        START taking these medications        Instructions   Dulera 200-5 MCG/ACT Aero  Generic drug: mometasone-formoterol   Doctor's comments: Dispense 1 inhaler  Inhale 2 Puffs 2 times a day for 30 days.  Dose: 2 Puff     predniSONE 20 MG Tabs  Commonly known as: Deltasone   Take 2 Tablets by mouth every day for 4 days.  Dose: 40 mg            CONTINUE taking these medications        Instructions   * albuterol 2.5mg/3ml Nebu solution for nebulization  Commonly known as: Proventil   Take 2.5 mg by nebulization every four hours as needed for Shortness of Breath.  Dose: 2.5 mg     * albuterol 108 (90 Base) MCG/ACT Aers inhalation aerosol   Inhale 2 Puffs every 6 hours as needed for Shortness of Breath.  Dose: 2 Puff     NON SPECIFIED   Take 2 Capsules by mouth every 6 hours as needed (Cough, Mild Pain or Fever). Unspecified over-the-counter cold medication.  Dose: 2 Capsule     Primatene Mist 0.125 MG/ACT Aero  Generic drug: EPINEPHrine   Inhale 2 Puffs every 6 hours as needed (Shortness of Breath).  Dose: 2 Puff           * This list has 2 medication(s) that are the same as other medications prescribed for you. Read the directions carefully, and ask your doctor or other care provider to review them with you.                  Allergies  No Known Allergies    DIET  Orders Placed This Encounter   Procedures    Diet Order Diet: Regular     Standing Status:   Standing     Number of Occurrences:   1     Diet::   Regular [1]         LABORATORY  Lab Results   Component Value Date    SODIUM 140 02/21/2025    POTASSIUM 4.2 02/21/2025    CHLORIDE 103 02/21/2025    CO2 18 (L) 02/21/2025    GLUCOSE 168 (H) 02/21/2025    BUN 9 02/21/2025     CREATININE 0.82 02/21/2025        Lab Results   Component Value Date    WBC 13.7 (H) 02/21/2025    HEMOGLOBIN 14.4 02/21/2025    HEMATOCRIT 42.2 02/21/2025    PLATELETCT 347 02/21/2025        Total time of the discharge process exceeds 36 minutes.

## 2025-02-21 NOTE — CARE PLAN
The patient is Watcher - Medium risk of patient condition declining or worsening    Shift Goals  Clinical Goals: Pt to remain free from falls. Pt to be weaned from O2 as tolerated.  Patient Goals: Rest, comfort    Progress made toward(s) clinical / shift goals:    Pt remains free from falls. Pt connected to  to help monitor SpO2. Pt weaned from 4L to RA and maintains SpO2 >90%. O2 remains available at bedside. Pt educated to call RN if he feels any shortness of breath.    Problem: Knowledge Deficit - Standard  Goal: Patient and family/care givers will demonstrate understanding of plan of care, disease process/condition, diagnostic tests and medications  Outcome: Progressing     Problem: Respiratory  Goal: Patient will achieve/maintain optimum respiratory ventilation and gas exchange  Outcome: Progressing       Patient is not progressing towards the following goals:

## 2025-02-24 NOTE — DISCHARGE PLANNING
Late Entry:  On 02/21/25  ALONSO went to bedside to discuss his discharge.  He does not have O2 on and when I asked about his breathing he told me he didn't need O2.    Sree is telling me that he has to leave by 2 pm as he has children to take care.  They do not have anyone else to watch the children.    ALONSO explained that while he was walking the oxygen saturation indicated that he needed O2.  He reiterated that he had children to take care of and that they were more important.    I explained that risks of leaving w/o O2 and encouraged him to return to the ER if he began having difficulty breathing.